# Patient Record
Sex: FEMALE | Race: BLACK OR AFRICAN AMERICAN | Employment: OTHER | URBAN - METROPOLITAN AREA
[De-identification: names, ages, dates, MRNs, and addresses within clinical notes are randomized per-mention and may not be internally consistent; named-entity substitution may affect disease eponyms.]

---

## 2019-04-30 ENCOUNTER — HOSPITAL ENCOUNTER (OUTPATIENT)
Dept: PHYSICAL THERAPY | Age: 69
Discharge: HOME | End: 2019-04-30
Payer: MEDICARE

## 2019-04-30 PROCEDURE — 97162 PT EVAL MOD COMPLEX 30 MIN: CPT

## 2019-04-30 PROCEDURE — 97110 THERAPEUTIC EXERCISES: CPT

## 2019-04-30 NOTE — PROGRESS NOTES
Joanna Huber Dell  : 1950  [place]  Secondary: Devon Melendez at [facility]  [address] [place] [address]  [phone]   [phone]    OUTPATIENT PHYSICAL THERAPY: Daily Treatment Note 2019  Pre-treatment Symptoms/Complaints:  Patient reports general lumbar and B buttock pain that exacerbates as the day progresses and is best in the morning. She denies any difficulty sleeping and reports pain as the day progresses is eased with taking Tramadol. She denies any LE paresthesia or pain, saddles anesthesia or any increase pain with cough or sneeze. Pain: Initial: Pain Intensity 1: 4  Pain Location 1: Spine, lumbar, Buttocks  Pain Orientation 1: Posterior  Pain Intervention(s) 1: Rest, Repositioned Post Session:  2/10   Medications Last Reviewed: 2019  Updated Objective Findings (Used abbreviations: VCT - Vertical Compression Test; LPM - Lumbar Protective Mechanism):  See evaluation. TREATMENT:   Manual Therapy (0 minutes): (Used abbreviations: SF - Superficial Fascia; BC - Bony contours; MP - muscle play; PNF - proprioceptive neuromuscular facilitation; IASTM - instrument-assisted soft tissue mobilization; MET - muscle energy technique; a/p - anterior to posterior; p/a - posterior to anterior) Manual treatment to improve soft tissue/joint mobility deficits, tissue integrity issues, trigger points and/or pain. Date:         Technique                                Neuromuscular Reeducation: (0 minutes):  Exercise/activities per grid below to improve movement, balance, coordination, kinesthetic sense, posture and/or proprioception. Required minimal verbal and manual cues to promote proper body alignment. Activity                                Therapeutic Exercise: (20 minutes):  Exercises per grid below to improve strength, endurance, range of motion, flexibility.  Required minimal verbal and manual cues to promote proper body alignment. Date:  04/30/19       Exercise        Walking/recumbent bike/UBE 15 min       Dynamic balance - tandem rhomberg  30 sec x 2 ea       Dynamic balance - single leg stance 15 sec x 3 ea       Home Exercise Program (HEP): as above; handouts given to patient for all exercises. [employer] [facility]  Treatment/Session Summary:    · Response to Treatment: Verbalized understanding and returned demo of home program.   · Compliance with Program/Exercises: Will assess as treatment progresses.   · Communication/Consultation:  None today  · Equipment provided today:  None today  · Recommendations/Intent for next treatment session: Progress mobility of hips, dymamic balance, and general LE strength and endurance  Treatment Plan of Care Effective Dates:  04/30/19 TO 07/23/19  Frequency of Treatment: 2 times a week for 6 weeks followed by 1 time a week for 6 weeks as progresses to more independence with gym program  Total Treatment Billable Duration:  20 minutes  PT Patient Time In/Time Out  Time In: 0900  Time Out: 1005  Delos [place], PT    Future Appointments   Date Time Provider Carmela Kim   5/6/2019  1:00 PM Hari Mangle, PT [facility]   5/8/2019 12:00 PM Hari Mangle, PT [facility]   5/13/2019  2:00 PM Hari Mangle, PT [facility]   5/16/2019  3:00 PM Hari Mangle, PT [facility]   5/20/2019  2:00 PM Hari Mangle, PT [facility]   5/22/2019  2:00 PM Hari Mangle, PT [facility]   5/28/2019  3:00 PM Varghese Curiel, PT [facility]

## 2019-04-30 NOTE — THERAPY EVALUATION
Joanna Linares Ou  : 1950  [place]  Secondary: Devon Melendez at [facility]  [address] [place] [address]  [phone]   [phone]       OUTPATIENT PHYSICAL THERAPY:Initial Assessment 2019   ICD-10: Treatment Diagnosis: Spinal stenosis, lumbar region (M48.06); Fusion of spine, lumbar region (M43.26); Difficulty walking (R26.2)  Precautions/Allergies: Codeine   MD Orders: Eval and treat MEDICAL/REFERRING DIAGNOSIS:  Back pain [M54.9]   DATE OF ONSET: 19  REFERRING PHYSICIAN: Jose D Molina MD  RETURN PHYSICIAN APPOINTMENT: 05/15/19   INITIAL ASSESSMENT:  Ms. Tadeo Fernandez presents with low back pain status post lumbar fusion due to worsening spinal stenosis with objective findings including loss of mobility through lumbopelvic region, thoracic/costal cage and B hip, generalized proximal hip and trunk weakness, dynamic balance deficits with limitations in functional mobility exhibiting medical necessity and need for skilled level of physical therapy. PROBLEM LIST (Impacting functional limitations):  1. Increased pain through low back limiting tolerance of ADLs and difficulty sleeping  2. Decreased activity tolerance for basic and instrumental ADLs  3. Decreased ADL/functional activities specificially with all functional mobility  4. Outcome measure score of 31/50 on Modified Oswestry with moderate effect of low back on patient's ability to manage every day life activities  5. Decreased strength through trunk and B LE limiting functional mobility  6. Decreased flexibility/mobility/ROM through thoracic, lumbar, pelvic and B hips limiting functional mobility  7. Decreased balance with decreased dynamic center of gravity control and decreased gaze stabilization with functional activities including gait, stairs, curbs, and mobility at home INTERVENTIONS PLANNED:  1.  Patient education including pathophysiology of post op plan and general guidelines  2. Back education & training (sleeping, sitting and standing positions, posture re-education and body mechanics)  3. ROM/mobility of spine, specifically thoracic and B hips  4. Manual therapy including soft tissue mobilizations, functional joint mobilizations and neuromuscular re-education to lumbopelvic region  5. Neuromuscular re-education with focus on initiation/strength and endurance of trunk and motor control with B LE  6. Therapeutic exercises including general endurance and strengthening of trunk and B LE  7. Gait training with focus on correcting deficits, sequencing and erendira  8. Balance exercise - focused on standing dynamic balance with greatest focus on dynamic center of gravity control  9. Instructions of home exercise program (HEP)   TREATMENT PLAN:  Treatment Plan of Care Effective Dates: 04/30/19 TO 07/23/19  Frequency of Treatment/Duration: 2 times a week for 6 weeks followed by 1 time a week for 6 weeks as progresses to more independence with gym program  GOALS: (Goals have been discussed and agreed upon with patient.)  Short-Term Functional Goals: Time Frame: 2 weeks  1. Patient will be independent with home program with cuing needed. 2. Patient will be able to attend gym for endurance exercises 3 days/week without difficulty or increased LBP  Discharge Goals: Time Frame: 12 weeks  1. Patient will be independent and able to tolerate all ADLs without increase of LBP  2. Patient will be independent with all functional mobility in community without deficits  3. Patient will score less than 20/50 on Modified Oswestry with minimum effect of low back on patient's ability to manage every day life activities  OUTCOME MEASURE:   Tool Used: Modified Oswestry Low Back Pain Questionnaire  Score:  Initial: 31/50 (04/30/19)  Most Recent: X/50 (Date: -- )   Interpretation of Score: Each section is scored on a 0-5 scale, 5 representing the greatest disability.   The scores of each section are added together for a total score of 50. MEDICAL NECESSITY:   · Patient is expected to demonstrate progress in strength, range of motion and balance to increase independence with functional mobility and improve safety during community activities. · Rehabilitation Potential For Stated Goals: Excellent  REASON FOR SERVICES/OTHER COMMENTS:  · Patient continues to require skilled intervention due to  loss of mobility through lumbopelvic region, thoracic/costal cage and B hip, generalized proximal hip and trunk weakness, dynamic balance deficits with limitations in functional mobility. Total Assessment Duration: 45 minutes  PT Patient Time In/Time Out  Time In: 0900  Time Out: 1005    Regarding Joanna Herrera's therapy, I certify that the treatment plan above will be carried out by a therapist or under their direction. Thank you for this referral,  Joao Aleman PT     Referring Physician Signature: Jigar Gupta MD _______________________________ Date _____________          The information in this section was collected on 4/30/2019 (except where otherwise noted). HISTORY:   History of Present Injury/Illness (Reason for Referral):  Patient reports gradual worsening of low back pain with neurogenic claudication that was affecting ability to perform daily activities and elected for L3-S1 B laminectomy, L3-S1 posterior fixation and posterior lateral fustion of L3/4 with posterior lateral and interbody fusion at L4/5 and L5/S1 on 03/27/19. Post-operatively, she had some complications from surgery that required her to be hospitalized x 10 days. She then was discharged from home and had home health physical therapy x 4 weeks before referral to outpatient physical therapy. Patient reports general lumbar and B buttock pain that exacerbates as the day progresses and is best in the morning.  She denies any difficulty sleeping and reports she is best in the am and pain worsens as the day progresses and is eased with taking Tramadol. She denies any LE paresthesia or pain, saddles anesthesia or any increase pain with cough or sneeze. Patient's goals for therapy are to resume all prior level of function with ADLs and independence with functional mobility in community including with gait and stairs. Past Medical History/Comorbidities:   Ms. Minerva Mendoza  has a past medical history of H/O bone density study (2013), H/O bone density study (2/15/2016), H/O colonoscopy (2012), HSV-2 (herpes simplex virus 2) infection, Hypercholesteremia, Hypertension, and Osteopenia (2013 [facility]). Ms. Minerva Mendoza  has a past surgical history that includes pr abdomen surgery proc unlisted; hx gyn; hx reynaldo and bso; hx gyn; hx other surgical; hx orthopaedic; hx orthopaedic; hx mohs procedure (Left, 05/20/2016); hx back surgery (2013); and hx back surgery (03/27/2019). Social History/Living Environment:  Patient lives alone. Patient denies and physical barriers at home. Prior Level of Function/Work/Activity:  Patient is retired. Patient reports a moderate physical activity level that includes exercising regularly at the [employer] 3-5 x/week. Ambulatory/Rehab Services H2 Model Falls Risk Assessment    Risk Factors:       No Risk Factors Identified Ability to Rise from Chair:       (0)  Ability to rise in a single movement    Falls Prevention Plan:       No modifications necessary   Total: (5 or greater = High Risk): 0    ©2010 [facility] [employer]. All Rights Reserved. [facility] Patent #[id number]. Federal Law prohibits the replication, distribution or use without written permission from [facility] Valentin Uzhun   Current Medications:  Tramadol 50 mg 1 pill 2x/day; Cyclobensaprine 10 mg 2 x/day     Current Outpatient Medications:     hydrocortisone (ANUSOL-HC) 2.5 % rectal cream, Insert  into rectum four (4) times daily. , Disp: 30 g, Rfl: 2    estradiol (ESTRACE) 0.5 mg tablet, Take 1 Tab by mouth daily. , Disp: 30 Tab, Rfl: 11   hydrochlorothiazide (HYDRODIURIL) 25 mg tablet, Take 25 mg by mouth daily. , Disp: , Rfl:     atorvastatin (LIPITOR) 20 mg tablet, Take  by mouth daily. , Disp: , Rfl:     Zolpidem 10 mg subl, by SubLINGual route., Disp: , Rfl:     Calcium-Cholecalciferol, D3, (CALCIUM 600 WITH VITAMIN D3) 600 mg(1,500mg) -400 unit cap, Take  by mouth., Disp: , Rfl:     Biotin 2,500 mcg cap, Take  by mouth., Disp: , Rfl:     olmesartan (BENICAR) 20 mg tablet, Take 40 mg by mouth daily. , Disp: , Rfl:     aspirin delayed-release (ASPIR-LOW) 81 mg tablet, Take 81 mg by mouth daily. , Disp: , Rfl:    Date Last Reviewed:  5/1/2019   Number of Personal Factors/Comorbidities that affect the Plan of Care: 1-2: MODERATE COMPLEXITY   EXAMINATION:   Observation/Orthostatic Postural Assessment: Patient exhibits a reduced lumbar lordosis and accentuated thoracic kyphosis with no type I curve. Costal cage is positioned posterior and rotated anterior over a posterior pelvic inclination. Lower extremity weight bearing is symmetrical. Shoulder symmetry exhibits R depressed and dominant. Observation of gait indicates decreased stance with short step length and very limited toe off B (patient is a leg walker versus an efficient trunk walker). Lower quadrant bony landmarks are symmetrical. Vertical compression test (VCT) for alignment is 1/5 with anterior pelvic shear. Soft tissue observation indicates mid-line well healing scar over lumbar region that shows constrictions over the upper 1/2; generalized atrophy through gluteals/hamstrings and paraspinals. Palpation: Myofascial assessment indicates significant scar restrictions through lumbar scar. Muscle length testing in modified Bobo position exhibits -30 from neutral B for iliopsoas. Hamstring length tested supine with straight leg raise (SLR) is 75 degrees B and WNL. Quadriceps flexibility tested prone with heel to buttock is WNL. Delman Ghent test is (-) B for rectus femoris tightness.  Gastrocnemius and soleus flexibility are WNL. ROM: Passive seated trunk rotation is 50% available to the R and 50% available to the L. Kinetic testing in standing including forward bend test is + B. Marcher's test is (-) B. Leg swing for innominate mobility indicates decreased extension B. Pelvic shear test is standing exhibits decreased excursion of B shear with a hard end feel end feel. Passive ROM through lumbopelvic flexion is hypomobile through lumbar, sacral, innominate and hip. Gross cervical rotation is 60% B. Shoulder abduction with palm down is 150 B. AROM (PROM) (* - denotes pain) Right Left   Hip flexion/Innominate flexion 80/90/100/100 80/90/100/100   Hip extension/Innominate extension -5/-5 -5/-5   Hip external rotation (ER) 35 40   Hip internal rotation (IR) 10 15   Hip abduction 20 20   Hip adduction 25 25   Strength: Lumbar protective mechanism (LPM) & Automatic Core Engagement (ACE):   Strength: */5; Engagement scored as present/absent/sluggish R diagonal L diagonal   LPM - Flexion absent absent   LPM - Extension absent absent     Manual Muscle Test (*/5) Right Left   Knee extension 5 5   Knee flexion 4+ 4+   Hip flexion 4+ 4+   Hip ER 4+ 4+   Hip IR 4 4   Hip extension 4+ 4+   Hip abduction 4+ 4+   Hip adduction 5 5   Ankle DF 5 5   Ankle PF 5 5   Special Tests:     Functional squat (LE clearance): limitation B hips  Janeal Downer test: (-) B  Mavis Rideau test is (-) B  Neurological Screen:  Myotomes: Key muscle strength testing through bilateral LE exhibits generalized weakness but no pattern. Dermatomes: Sensation testing through bilateral lower quadrants for light touch is intact. Reflexes: L3/4-Patellar; L5-Extensor digitorum brevis; S1-Achilles - 2+ and WNL.    Cord signs: Babinski/plantar response, clonus - (-)  Neural tension tests: Passive straight leg raise(SLR)/Lasègues test - (-); crossed SLR test - (-)  Functional Mobility:  Independent    Balance: Age appropriate for sitting  both statically and dynamically; age appropriate for standing statically and limited dynamically - single leg stance less than 5 second on R leg and less than 3 seconds on L; tandem rhomberg less than 15 sec B  Mental Status: Alert and oriented to person, time and place. Body Structures Involved:  3. Thoracic Cage  4. Joints  5. Muscles Body Functions Affected:  4. Sensory/Pain  5. Neuromusculoskeletal  6. Movement Related Activities and Participation Affected:  1. Mobility  2. Self Care  3.  Domestic Life   Number of elements (examined above) that affect the Plan of Care: 4+: HIGH COMPLEXITY   CLINICAL PRESENTATION:   Presentation: Evolving clinical presentation with changing clinical characteristics: MODERATE COMPLEXITY   CLINICAL DECISION MAKING:   Use of outcome tool(s) and clinical judgement create a POC that gives a: Questionable prediction of patient's progress: MODERATE COMPLEXITY        Kathrin Cruz PT    Future Appointments   Date Time Provider Carmela Kim   5/6/2019  1:00 PM [employer] Messenger, PT [facility]   5/8/2019 12:00 PM Xochilt Curiel, PT [facility]   5/13/2019  2:00 PM [place] Messenger, PT [facility]   5/16/2019  3:00 PM [place] Messenger, PT [facility]   5/20/2019  2:00 PM [place] Messenger, PT [facility]   5/22/2019  2:00 PM Avila Messenger, PT [facility]   5/28/2019  3:00 PM Xochilt Curiel, PT [facility]

## 2019-05-08 ENCOUNTER — HOSPITAL ENCOUNTER (OUTPATIENT)
Dept: PHYSICAL THERAPY | Age: 69
Discharge: HOME | End: 2019-05-08
Payer: MEDICARE

## 2019-05-08 PROCEDURE — 97140 MANUAL THERAPY 1/> REGIONS: CPT

## 2019-05-08 PROCEDURE — 97110 THERAPEUTIC EXERCISES: CPT

## 2019-05-08 NOTE — PROGRESS NOTES
Joanna Cline Nailer  : 1950  [place]  Secondary: Devon Melendez at [facility]  [address] [facility], [address] [place] [address]  [phone]   [phone]    OUTPATIENT PHYSICAL THERAPY: Daily Treatment Note 2019  Pre-treatment Symptoms/Complaints:  Patient reports general lumbar and B buttock pain that exacerbates as the day progresses and is best in the morning; only taking Tramadol at night now  Pain: Initial: Pain Intensity 1: 1  Pain Location 1: Buttocks  Pain Orientation 1: Posterior  Pain Intervention(s) 1: Rest, Repositioned Post Session:  2/10   Medications Last Reviewed: 2019  Updated Objective Findings (Used abbreviations: VCT - Vertical Compression Test; LPM - Lumbar Protective Mechanism: HISL - hip/innominate/sacral/lumbar mobility):   Date:  19 Date:   Date:     HISL 80/90/100/100 B     Hip/innominate extension -5/-5 B     LPM Absent all diagonals     VCT 1/5; anterior shear     Bobo -30 B     Passive seated trunk rotation 50% B     Single leg stance less than 5 second on R leg and less than 3 seconds on L     Tandem Rhomberg less than 15 sec B        TREATMENT:   Manual Therapy (20 minutes): (Used abbreviations: SF - Superficial Fascia; BC - Bony contours; MP - muscle play; PNF - proprioceptive neuromuscular facilitation; IASTM - instrument-assisted soft tissue mobilization; MET - muscle energy technique; a/p - anterior to posterior; p/a - posterior to anterior) Manual treatment to improve soft tissue/joint mobility deficits, tissue integrity issues, trigger points and/or pain.    Date:  19       Technique        Prone - SF/scar mobilty 5 min       Quadriceps stretching and B hip on axis 5 min       Hip distraction with flexion 5 min       Bobo 5 min       Neuromuscular Reeducation: (0 minutes):  Exercise/activities per grid below to improve movement, balance, coordination, kinesthetic sense, posture and/or proprioception. Required minimal verbal and manual cues to promote proper body alignment. Activity                                Therapeutic Exercise: (40 minutes):  Exercises per grid below to improve strength, endurance, range of motion, flexibility. Required minimal verbal and manual cues to promote proper body alignment. Date:  04/30/19 Date:  05/09/19      Exercise        Walking/recumbent bike/UBE 15 min 1/2 mile- 15 min; UBE 10 min      Active single knee to chest  5 min      B gluteal/hip rotator stretching in supine  5 min      Dynamic balance - tandem rhomberg  30 sec x 2 ea 30 sec x 2 ea      Dynamic balance - single leg stance 15 sec x 3 ea 15 sec x 3 ea      Home Exercise Program (HEP): as above; handouts given to patient for all exercises. [employer] [facility]  Treatment/Session Summary:    · Response to Treatment: Good endurance tolerance with 2 breaks;  Bobo improved to -10 B  · Compliance with Program/Exercises: Compliant  · Communication/Consultation:  None today  · Equipment provided today:  None today  · Recommendations/Intent for next treatment session: Progress mobility of hips, dymamic balance, and general LE strength and endurance  Treatment Plan of Care Effective Dates:  04/30/19 TO 07/23/19  Frequency of Treatment: 2 times a week for 5 weeks followed by 1 time a week for 6 weeks as progresses to more independence with gym program  Total Treatment Billable Duration: 60 minutes  PT Patient Time In/Time Out  Time In: 1200  Time Out: 1305  Kathrin Cruz PT    Future Appointments   Date Time Provider Carmela Kim   5/13/2019  2:00 PM Avila Collins, PT [facility]   5/16/2019  3:00 PM Avila Collins, PT [facility]   5/20/2019  2:00 PM Avila Collins, PT [facility]   5/22/2019  2:00 PM Avila Collins, PT [facility]   5/28/2019  3:00 PM Xochilt Curiel, PT [facility]

## 2019-05-13 ENCOUNTER — HOSPITAL ENCOUNTER (OUTPATIENT)
Dept: PHYSICAL THERAPY | Age: 69
Discharge: HOME | End: 2019-05-13
Payer: MEDICARE

## 2019-05-13 PROCEDURE — 97140 MANUAL THERAPY 1/> REGIONS: CPT

## 2019-05-13 PROCEDURE — 97110 THERAPEUTIC EXERCISES: CPT

## 2019-05-13 NOTE — PROGRESS NOTES
Joanna Herrera  : 1950  [place]  Secondary: Devon Melendez at [facility]  [address] [place] [address]  [phone]   [phone]    OUTPATIENT PHYSICAL THERAPY: Daily Treatment Note 2019  Pre-treatment Symptoms/Complaints:  Patient reports general lumbar and B buttock pain that exacerbates as the day progresses and is best in the morning; only taking Tramadol at night now  Pain: Initial: Pain Intensity 1: 1  Pain Location 1: Spine, lumbar, Buttocks  Pain Orientation 1: Posterior Post Session:  2/10   Medications Last Reviewed: 2019  Updated Objective Findings (Used abbreviations: VCT - Vertical Compression Test; LPM - Lumbar Protective Mechanism: HISL - hip/innominate/sacral/lumbar mobility):   Date:  19 Date:  19 Date:     HISL 80/90/100/100 B 85/95/105/105B    Hip/innominate extension -5/-5 B -5/-5 B    LPM Absent all diagonals Absent all diagonals    VCT 1/5; anterior shear 1/5; anterior shear    Bobo -30 B -20 B    Passive seated trunk rotation 50% B 50% B    Single leg stance less than 5 second on R leg and less than 3 seconds on L 5-10 sec B    Tandem Rhomberg less than 15 sec B Less 20 sec B       TREATMENT:   Manual Therapy (20 minutes): (Used abbreviations: SF - Superficial Fascia; BC - Bony contours; MP - muscle play; PNF - proprioceptive neuromuscular facilitation; IASTM - instrument-assisted soft tissue mobilization; MET - muscle energy technique; a/p - anterior to posterior; p/a - posterior to anterior) Manual treatment to improve soft tissue/joint mobility deficits, tissue integrity issues, trigger points and/or pain.    Date:  19 Date:  19      Technique        Prone - SF/scar mobilty 5 min 5 min      Quadriceps stretching and B hip on axis 5 min 5 min      Hip distraction with flexion 5 min 5 min      Bobo 5 min 5 min      Neuromuscular Reeducation: (0 minutes):  Exercise/activities per grid below to improve movement, balance, coordination, kinesthetic sense, posture and/or proprioception. Required minimal verbal and manual cues to promote proper body alignment. Activity                                Therapeutic Exercise: (40 minutes):  Exercises per grid below to improve strength, endurance, range of motion, flexibility. Required minimal verbal and manual cues to promote proper body alignment. Date:  04/30/19 Date:  05/09/19 Date:  05/13/19     Exercise        Walking/recumbent bike/UBE 15 min 1/2 mile- 15 min; UBE 10 min 1/2 mile; ergometer 10 min     Active single knee to chest  5 min See manual     B gluteal/hip rotator stretching in supine  5 min See manual     Dynamic balance - tandem rhomberg  30 sec x 2 ea 30 sec x 2 ea 10 min     Dynamic balance - single leg stance 15 sec x 3 ea 15 sec x 3 ea 10 min     Home Exercise Program (HEP): as above; handouts given to patient for all exercises. [employer] Portal  Treatment/Session Summary:    · Response to Treatment: Good endurance tolerance with no breaks today for walking or ergometer;  Bobo improved to -10 B and hip to 90  · Compliance with Program/Exercises: Compliant  · Communication/Consultation:  None today  · Equipment provided today:  None today  · Recommendations/Intent for next treatment session: Progress mobility of hips, dymamic balance, and general LE strength and endurance  Treatment Plan of Care Effective Dates:  04/30/19 TO 07/23/19  Frequency of Treatment: 2 times a week for 4 weeks followed by 1 time a week for 6 weeks as progresses to more independence with gym program  Total Treatment Billable Duration: 60 minutes  PT Patient Time In/Time Out  Time In: 1400  Time Out: 1500  Joao Aleman PT    Future Appointments   Date Time Provider Carmela Kim   5/16/2019  9:00 AM Hermilo Rader, PT [facility]   5/20/2019  2:00 PM Hermilo Rader, PT [facility]   5/22/2019 2:00 PM Jass Grant, PT [facility]   5/28/2019  3:00 PM Toribio Curiel, PT [facility]

## 2019-05-16 ENCOUNTER — HOSPITAL ENCOUNTER (OUTPATIENT)
Dept: PHYSICAL THERAPY | Age: 69
Discharge: HOME | End: 2019-05-16
Payer: MEDICARE

## 2019-05-16 PROCEDURE — 97110 THERAPEUTIC EXERCISES: CPT

## 2019-05-16 PROCEDURE — 97140 MANUAL THERAPY 1/> REGIONS: CPT

## 2019-05-16 NOTE — PROGRESS NOTES
Joanna Kingston  : 1950  [place]  Secondary: Devon Melendez at [facility]  [address] [place] [address]  [phone]   [phone]    OUTPATIENT PHYSICAL THERAPY: Daily Treatment Note 2019  Pre-treatment Symptoms/Complaints:  Patient reports returned to see Dr. Ilan Ricketts and released to return to therapeutic yoga and follow up with x-rays in 6 more weeks. Reports general lumbar and B buttock pain that exacerbates as the day progresses and is best in the morning; only taking Tramadol at night now. Pain: Initial: Pain Intensity 1: 1  Pain Location 1: Spine, lumbar, Buttocks  Pain Orientation 1: Posterior Post Session:  2/10   Medications Last Reviewed: 2019  Updated Objective Findings (Used abbreviations: VCT - Vertical Compression Test; LPM - Lumbar Protective Mechanism: HISL - hip/innominate/sacral/lumbar mobility):   Date:  19 Date:  19 Date:  19   HISL 80/90/100/100 B 85/95/105/105B 85/95/105/105B   Hip/innominate extension -5/-5 B -5/-5 B -5/-5 B   LPM Absent all diagonals Absent all diagonals Sluggish; 1/5 all diagonals   VCT 1/5; anterior shear 1/5; anterior shear 1/5; anterior shear   Bobo -30 B -20 B -20 B   Passive seated trunk rotation 50% B 50% B 50% B   Single leg stance less than 5 second on R leg and less than 3 seconds on L 5-10 sec B 5-10 sec B   Tandem Rhomberg less than 15 sec B Less 20 sec B Less 20 sec B      TREATMENT:   Manual Therapy (15 minutes): (Used abbreviations: SF - Superficial Fascia; BC - Bony contours; MP - muscle play; PNF - proprioceptive neuromuscular facilitation; IASTM - instrument-assisted soft tissue mobilization; MET - muscle energy technique; a/p - anterior to posterior; p/a - posterior to anterior) Manual treatment to improve soft tissue/joint mobility deficits, tissue integrity issues, trigger points and/or pain.    Date:  19 Date:  05/13/19 Date:  05/16/19     Technique        Prone - SF/scar mobilty 5 min 5 min -     Quadriceps stretching and B hip on axis 5 min 5 min 5 min     Hip distraction with flexion 5 min 5 min 5 min     Bobo 5 min 5 min 5 min     Neuromuscular Reeducation: (0 minutes):  Exercise/activities per grid below to improve movement, balance, coordination, kinesthetic sense, posture and/or proprioception. Required minimal verbal and manual cues to promote proper body alignment. Activity                                Therapeutic Exercise: (45 minutes):  Exercises per grid below to improve strength, endurance, range of motion, flexibility. Required minimal verbal and manual cues to promote proper body alignment. Date:  04/30/19 Date:  05/09/19 Date:  05/13/19 Date:  05/16/19    Exercise        Walking/recumbent bike/UBE 15 min 1/2 mile- 15 min; UBE 10 min 1/2 mile; ergometer 10 min 1/2 mile without rest    Active single knee to chest  5 min See manual 10 x 1    B gluteal/hip rotator stretching in supine  5 min See manual 30 sec x 1 ea    Dynamic balance - tandem rhomberg  30 sec x 2 ea 30 sec x 2 ea 10 min 10 min    Dynamic balance - single leg stance 15 sec x 3 ea 15 sec x 3 ea 10 min 10 min    Home Exercise Program (HEP): as above; handouts given to patient for all exercises.   [employer] Portal  Treatment/Session Summary:    · Response to Treatment: Lavinia Kehr improved to -10 B and hip to 90  · Compliance with Program/Exercises: Compliant  · Communication/Consultation:  None today  · Equipment provided today:  None today  · Recommendations/Intent for next treatment session: Progress mobility of hips, dymamic balance, and general LE strength and endurance  Treatment Plan of Care Effective Dates:  04/30/19 TO 07/23/19  Frequency of Treatment: 2 times a week for 4 weeks followed by 1 time a week for 6 weeks as progresses to more independence with gym program  Total Treatment Billable Duration: 55 minutes  PT Patient Time In/Time Out  Time In: 0910  Time Out: 1010  Darcy Gamez, PT    Future Appointments   Date Time Provider Carmela Judy   5/20/2019  2:00 PM Anthony Thakkar, PT [facility]   5/22/2019  2:00 PM Anthony Thakkar, PT [facility]   5/28/2019  3:00 PM Anthony Thakkar, PT [facility]   6/10/2019 10:00 AM Anthony Thakkar, PT [facility]   6/12/2019 10:00 AM Anthony Thakkar, PT [facility]   6/17/2019 10:00 AM Anthony Thakkar, PT [facility]   6/19/2019 10:00 AM Anthony Thakkar, PT [facility]   6/25/2019  3:00 PM Anthony Thakkar, PT [facility]   6/27/2019  9:00 AM Sherie Curiel, PT [facility]

## 2019-05-20 ENCOUNTER — HOSPITAL ENCOUNTER (OUTPATIENT)
Dept: PHYSICAL THERAPY | Age: 69
Discharge: HOME | End: 2019-05-20
Payer: MEDICARE

## 2019-05-20 PROCEDURE — 97140 MANUAL THERAPY 1/> REGIONS: CPT

## 2019-05-20 PROCEDURE — 97110 THERAPEUTIC EXERCISES: CPT

## 2019-05-20 NOTE — PROGRESS NOTES
Joanna Ferguson  : 1950  [place]  Secondary: Devon Melendez at [facility]  [address] [place] [address], [facility], [address] [place] [address]  [phone]   [phone]    OUTPATIENT PHYSICAL THERAPY: Daily Treatment Note 2019  Pre-treatment Symptoms/Complaints:  Patient reports general lumbar ache/pain that exacerbates as the day progresses and is best in the morning; only taking Tramadol at night now. Pain: Initial: Pain Intensity 1: 2  Pain Location 1: Spine, lumbar  Pain Orientation 1: Posterior, Mid Post Session:  0/10   Medications Last Reviewed: 2019  Updated Objective Findings (Used abbreviations: VCT - Vertical Compression Test; LPM - Lumbar Protective Mechanism: HISL - hip/innominate/sacral/lumbar mobility):   Date:  19 Date:  19 Date:  19 Date:  19   HISL 80/90/100/100 B 85/95/105/105B 85/95/105/105B 85/95/105/105B   Hip/innominate extension -5/-5 B -5/-5 B -5/-5 B -5/-5 B   LPM Absent all diagonals Absent all diagonals Sluggish; 1/5 all diagonals Sluggish; 1/5 all diagonals   VCT 1/5; anterior shear 1/5; anterior shear 1/5; anterior shear 1/5; anterior shear   Bobo -30 B -20 B -20 B -20 B   Passive seated trunk rotation 50% B 50% B 50% B 50% B   Single leg stance less than 5 second on R leg and less than 3 seconds on L 5-10 sec B 5-10 sec B 15 sec B   Tandem Rhomberg less than 15 sec B Less 20 sec B Less 20 sec B 30 sec B      TREATMENT:   Manual Therapy (20 minutes): (Used abbreviations: SF - Superficial Fascia; BC - Bony contours; MP - muscle play; PNF - proprioceptive neuromuscular facilitation; IASTM - instrument-assisted soft tissue mobilization; MET - muscle energy technique; a/p - anterior to posterior; p/a - posterior to anterior) Manual treatment to improve soft tissue/joint mobility deficits, tissue integrity issues, trigger points and/or pain.    Date:  19 Date:  19 Date:  05/16/19 Date:  05/20/19    Technique        Prone - SF/scar mobilty 5 min 5 min - 5 min    Quadriceps stretching and B hip on axis 5 min 5 min 5 min 5 min    Hip distraction with flexion 5 min 5 min 5 min 5 min    Bobo/1/2 prone rectus femoris 5 min 5 min 5 min 5 min    Neuromuscular Reeducation: (0 minutes):  Exercise/activities per grid below to improve movement, balance, coordination, kinesthetic sense, posture and/or proprioception. Required minimal verbal and manual cues to promote proper body alignment. Activity                                Therapeutic Exercise: (40 minutes):  Exercises per grid below to improve strength, endurance, range of motion, flexibility. Required minimal verbal and manual cues to promote proper body alignment. Date:  04/30/19 Date:  05/09/19 Date:  05/13/19 Date:  05/16/19 Date:  05/20/19   Exercise        Walking/recumbent bike/UBE 15 min 1/2 mile- 15 min; UBE 10 min 1/2 mile; ergometer 10 min 1/2 mile without rest 3/4 mile without rest   Cat/Cow for spinal mobility     5 x 1   Active single knee to chest  5 min See manual 10 x 1 HEP   B gluteal/hip rotator stretching in supine  5 min See manual 30 sec x 1 ea HEP   Dynamic balance - tandem rhomberg  30 sec x 2 ea 30 sec x 2 ea 10 min 10 min 10 min   Dynamic balance - single leg stance 15 sec x 3 ea 15 sec x 3 ea 10 min 10 min 10 min   Home Exercise Program (HEP): as above; handouts given to patient for all exercises.   [facility] Portal  Treatment/Session Summary:    · Response to Treatment: Kellie Huynh improved to -10 B and hip to 90  · Compliance with Program/Exercises: Compliant  · Communication/Consultation:  None today  · Equipment provided today:  None today  · Recommendations/Intent for next treatment session: Progress mobility of hips, dymamic balance, and general LE strength and endurance  Treatment Plan of Care Effective Dates:  04/30/19 TO 07/23/19  Frequency of Treatment: 2 times a week for 3 weeks followed by 1 time a week for 6 weeks as progresses to more independence with gym program  Total Treatment Billable Duration: 60 minutes  PT Patient Time In/Time Out  Time In: 1400  Time Out: 1500  Darcy Gamez, PT    Future Appointments   Date Time Provider Carmela Kim   5/22/2019  2:00 PM Anthony Thakkar, PT [facility]   5/28/2019  3:00 PM Anthony Thakkar, PT [facility]   6/10/2019 10:00 AM Anthony Thakkar, PT [facility]   6/12/2019 10:00 AM Anthony Thakkar, PT [facility]   6/17/2019 10:00 AM Anthony Thakkar, PT [facility]   6/19/2019 10:00 AM Anthony Thakkar, PT [facility]   6/25/2019  3:00 PM Anthony Thakkar, PT [facility]   6/27/2019  9:00 AM Sherie Curiel, PT [facility]

## 2019-05-22 ENCOUNTER — HOSPITAL ENCOUNTER (OUTPATIENT)
Dept: PHYSICAL THERAPY | Age: 69
Discharge: HOME | End: 2019-05-22
Payer: MEDICARE

## 2019-05-22 PROCEDURE — 97140 MANUAL THERAPY 1/> REGIONS: CPT

## 2019-05-22 PROCEDURE — 97110 THERAPEUTIC EXERCISES: CPT

## 2019-05-22 NOTE — PROGRESS NOTES
Dianthius Scherry Oppenheim  : 1950  [place]  Secondary: Devon Melendez at [facility]  [address] [place] [address]  [phone]   [phone]    OUTPATIENT PHYSICAL THERAPY: Daily Treatment Note 2019  Pre-treatment Symptoms/Complaints:  Patient reports general lumbar ache/pain that exacerbates as the day progresses and is best in the morning; only taking Tramadol at night now.   Pain: Initial: Pain Intensity 1: 1  Pain Location 1: Spine, lumbar  Pain Orientation 1: Posterior, Mid Post Session:  0/10   Medications Last Reviewed: 2019  Updated Objective Findings (Used abbreviations: VCT - Vertical Compression Test; LPM - Lumbar Protective Mechanism: HISL - hip/innominate/sacral/lumbar mobility):   Date:  19 Date:  19 Date:  19 Date:  19 Date:  19   HISL 80/90/100/100 B 85/95/105/105B 85/95/105/105B 85/95/105/105B 85/95/105/105B   Hip/innominate extension -5/-5 B -5/-5 B -5/-5 B -5/-5 B -5/-5 B   LPM Absent all diagonals Absent all diagonals Sluggish; 1/5 all diagonals Sluggish; 1/5 all diagonals Sluggish; 1/5 all diagonals   VCT 1/5; anterior shear 1/5; anterior shear 1/5; anterior shear 1/5; anterior shear 1/5; anterior shear   Bobo -30 B -20 B -20 B -20 B -20 B   Passive seated trunk rotation 50% B 50% B 50% B 50% B 50% B   Single leg stance less than 5 second on R leg and less than 3 seconds on L 5-10 sec B 5-10 sec B 15 sec B 15 sec B   Tandem Rhomberg less than 15 sec B Less 20 sec B Less 20 sec B 30 sec B 30 sec B      TREATMENT:   Manual Therapy (20 minutes): (Used abbreviations: SF - Superficial Fascia; BC - Bony contours; MP - muscle play; PNF - proprioceptive neuromuscular facilitation; IASTM - instrument-assisted soft tissue mobilization; MET - muscle energy technique; a/p - anterior to posterior; p/a - posterior to anterior) Manual treatment to improve soft tissue/joint mobility deficits, tissue integrity issues, trigger points and/or pain. Date:  05/09/19 Date:  05/13/19 Date:  05/16/19 Date:  05/20/19 Date:  05/22/19   Technique        Prone - SF/scar mobilty 5 min 5 min - 5 min 5 min   Quadriceps stretching and B hip on axis 5 min 5 min 5 min 5 min 5 min   Hip distraction with flexion 5 min 5 min 5 min 5 min 5 min   Bobo/1/2 prone rectus femoris 5 min 5 min 5 min 5 min 5 min   Neuromuscular Reeducation: (0 minutes):  Exercise/activities per grid below to improve movement, balance, coordination, kinesthetic sense, posture and/or proprioception. Required minimal verbal and manual cues to promote proper body alignment. Activity                                Therapeutic Exercise: (40 minutes):  Exercises per grid below to improve strength, endurance, range of motion, flexibility. Required minimal verbal and manual cues to promote proper body alignment. Date:  04/30/19 Date:  05/09/19 Date:  05/13/19 Date:  05/16/19 Date:  05/20/19 Date:  05/22/19   Exercise         Walking/recumbent bike/UBE 15 min 1/2 mile- 15 min; UBE 10 min 1/2 mile; ergometer 10 min 1/2 mile without rest 3/4 mile without rest 3/4 mile without rest   Cat/Cow for spinal mobility     5 x 1 5 x 1   Active single knee to chest  5 min See manual 10 x 1 HEP HEP   B gluteal/hip rotator stretching in supine  5 min See manual 30 sec x 1 ea HEP HEP   Dynamic balance - tandem rhomberg  30 sec x 2 ea 30 sec x 2 ea 10 min 10 min 10 min 10 min   Dynamic balance - single leg stance 15 sec x 3 ea 15 sec x 3 ea 10 min 10 min 10 min 10 min   Home Exercise Program (HEP): as above; handouts given to patient for all exercises.   [employer] Portal  Treatment/Session Summary:    · Response to Treatment: [place] improved to -10 B and hip to 90  · Compliance with Program/Exercises: Compliant  · Communication/Consultation:  None today  · Equipment provided today:  None today  · Recommendations/Intent for next treatment session: Progress mobility of hips, dymamic balance, and general LE strength and endurance  Treatment Plan of Care Effective Dates:  04/30/19 TO 07/23/19  Frequency of Treatment: 2 times a week for 3 weeks followed by 1 time a week for 6 weeks as progresses to more independence with gym program  Total Treatment Billable Duration: 60 minutes  PT Patient Time In/Time Out  Time In: 1405  Time Out: 23 Yadira Beltran, PT    Future Appointments   Date Time Provider Carmela Kim   5/28/2019  3:00 PM July Floyd, PT [facility]   6/10/2019 10:00 AM Harlem Heightshaily Floyd, PT [facility]   6/12/2019 10:00 AM July Floyd, PT [facility]   6/17/2019 10:00 AM July Block, PT [facility]   6/19/2019 10:00 AM July Floyd, PT [facility]   6/25/2019  3:00 PM July Floyd, PT [facility]   6/27/2019  9:00 AM Elio Curiel, PT [facility]

## 2019-05-28 ENCOUNTER — HOSPITAL ENCOUNTER (OUTPATIENT)
Dept: PHYSICAL THERAPY | Age: 69
Discharge: HOME | End: 2019-05-28
Payer: MEDICARE

## 2019-05-28 PROCEDURE — 97110 THERAPEUTIC EXERCISES: CPT

## 2019-05-28 PROCEDURE — 97140 MANUAL THERAPY 1/> REGIONS: CPT

## 2019-05-28 NOTE — PROGRESS NOTES
Joanna Zuniga  : 1950  [place]  Secondary: Devon Melendez at [facility]  [address] [place] [address]  [phone]   [phone]    OUTPATIENT PHYSICAL THERAPY: Daily Treatment Note 2019  Pre-treatment Symptoms/Complaints:  Patient reports general lumbar ache/pain that exacerbates as the day progresses and is best in the morning; only taking Tramadol at night now.   Pain: Initial: Pain Intensity 1: 1  Pain Location 1: Spine, lumbar  Pain Orientation 1: Posterior, Mid Post Session:  0/10   Medications Last Reviewed: 2019  Updated Objective Findings (Used abbreviations: VCT - Vertical Compression Test; LPM - Lumbar Protective Mechanism: HISL - hip/innominate/sacral/lumbar mobility):   Date:  19 Date:  19 Date:  19 Date:  19 Date:  19 Date:  19   HISL 80/90/100/100 B 85/95/105/105B 85/95/105/105B 85/95/105/105B 85/95/105/105B 85/95/105/105B   Hip/innominate extension -5/-5 B -5/-5 B -5/-5 B -5/-5 B -5/-5 B -5/-5 B   LPM Absent all diagonals Absent all diagonals Sluggish; 1/5 all diagonals Sluggish; 1/5 all diagonals Sluggish; 1/5 all diagonals Sluggish; 1/5 all diagonals   VCT 1/5; anterior shear 1/5; anterior shear 1/5; anterior shear 1/5; anterior shear 1/5; anterior shear 2/5; anterior shear   Bobo -30 B -20 B -20 B -20 B -20 B -10 B   Passive seated trunk rotation 50% B 50% B 50% B 50% B 50% B 50% B   Single leg stance less than 5 second on R leg and less than 3 seconds on L 5-10 sec B 5-10 sec B 15 sec B 15 sec B 30 sec B   Tandem Rhomberg less than 15 sec B Less 20 sec B Less 20 sec B 30 sec B 30 sec B 30 sec B      TREATMENT:   Manual Therapy (20 minutes): (Used abbreviations: SF - Superficial Fascia; BC - Bony contours; MP - muscle play; PNF - proprioceptive neuromuscular facilitation; IASTM - instrument-assisted soft tissue mobilization; MET - muscle energy technique; a/p - anterior to posterior; p/a - posterior to anterior) Manual treatment to improve soft tissue/joint mobility deficits, tissue integrity issues, trigger points and/or pain. Date:  05/09/19 Date:  05/13/19 Date:  05/16/19 Date:  05/20/19 Date:  05/22/19 Date:  05/28/19   Technique         Prone - SF/scar mobilty 5 min 5 min - 5 min 5 min 10 min   Quadriceps stretching and B hip on axis 5 min 5 min 5 min 5 min 5 min 5 min   Hip distraction with flexion 5 min 5 min 5 min 5 min 5 min 5 min   Bobo/1/2 prone rectus femoris 5 min 5 min 5 min 5 min 5 min -   Neuromuscular Reeducation: (0 minutes):  Exercise/activities per grid below to improve movement, balance, coordination, kinesthetic sense, posture and/or proprioception. Required minimal verbal and manual cues to promote proper body alignment. Activity                                Therapeutic Exercise: (40 minutes):  Exercises per grid below to improve strength, endurance, range of motion, flexibility. Required minimal verbal and manual cues to promote proper body alignment. Date:  04/30/19 Date:  05/09/19 Date:  05/13/19 Date:  05/16/19 Date:  05/20/19 Date:  05/22/19 Date:  05/28/19   Exercise          Walking/recumbent bike/UBE 15 min 1/2 mile- 15 min; UBE 10 min 1/2 mile; ergometer 10 min 1/2 mile without rest 3/4 mile without rest 3/4 mile without rest 1 mile without rest   Cat/Cow for spinal mobility     5 x 1 5 x 1 5 x 1   Active single knee to chest  5 min See manual 10 x 1 HEP HEP HEP   B gluteal/hip rotator stretching in supine  5 min See manual 30 sec x 1 ea HEP HEP HEP   Dynamic balance - tandem rhomberg  30 sec x 2 ea 30 sec x 2 ea 10 min 10 min 10 min 10 min 10 min   Dynamic balance - single leg stance 15 sec x 3 ea 15 sec x 3 ea 10 min 10 min 10 min 10 min 10 min   Home Exercise Program (HEP): as above; handouts given to patient for all exercises.   [employer] Portal  Treatment/Session Summary:    · Response to Treatment: Anra Fultonham improved to -5 B and hip to 90  · Compliance with Program/Exercises: Compliant  · Communication/Consultation:  None today  · Equipment provided today:  None today  · Recommendations/Intent for next treatment session: Progress mobility of hips, dymamic balance, and general LE strength and endurance  Treatment Plan of Care Effective Dates:  04/30/19 TO 07/23/19  Frequency of Treatment: 2 times a week for 2 weeks followed by 1 time a week for 6 weeks as progresses to more independence with gym program  Total Treatment Billable Duration: 65 minutes  PT Patient Time In/Time Out  Time In: 1255  Time Out: 1600  Mitzy Smith PT    Future Appointments   Date Time Provider Carmela Kim   6/10/2019 10:00 AM Apryl Yepez PT [facility]   6/12/2019 10:00 AM Apryl Yepez PT [facility]   6/17/2019 10:00 AM Apryl Yepez PT [facility]   6/19/2019 10:00 AM Apryl Yepez PT [facility]   6/25/2019  3:00 PM Apryl Yepez PT [facility]   6/27/2019  9:00 AM Katja Curiel, PT [facility]

## 2019-06-10 ENCOUNTER — HOSPITAL ENCOUNTER (OUTPATIENT)
Dept: PHYSICAL THERAPY | Age: 69
Discharge: HOME | End: 2019-06-10
Payer: MEDICARE

## 2019-06-10 PROCEDURE — 97110 THERAPEUTIC EXERCISES: CPT

## 2019-06-10 PROCEDURE — 97140 MANUAL THERAPY 1/> REGIONS: CPT

## 2019-06-10 NOTE — PROGRESS NOTES
Joanna Solis  : 1950  [place]  Secondary: Devon Melendez at [facility]  [address] [place] [address]  [phone]   [phone]    OUTPATIENT PHYSICAL THERAPY: Daily Treatment Note 6/10/2019  Pre-treatment Symptoms/Complaints:  Patient reports general lumbar ache/pain that exacerbates as the day progresses and is best in the morning; stopped taking Tramadol at night.   Pain: Initial: Pain Intensity 1: 2  Pain Location 1: Spine, lumbar  Pain Orientation 1: Posterior Post Session:  0/10   Medications Last Reviewed: 6/10/2019  Updated Objective Findings (Used abbreviations: VCT - Vertical Compression Test; LPM - Lumbar Protective Mechanism: HISL - hip/innominate/sacral/lumbar mobility):   Date:  19 Date:  19 Date:  19 Date:  19 Date:  19 Date:  19 Date:  06/10/19   HISL 80/90/100/100 B 85/95/105/105B 85/95/105/105B 85/95/105/105B 85/95/105/105B 85/95/105/105B 89/100/110/110L; 85/95/105/105R   Hip/innominate extension -5/-5 B -5/-5 B -5/-5 B -5/-5 B -5/-5 B -5/-5 B -5/-5 B   LPM Absent all diagonals Absent all diagonals Sluggish; 1/5 all diagonals Sluggish; 1/5 all diagonals Sluggish; 1/5 all diagonals Sluggish; 1/5 all diagonals Sluggish; 1/5 all diagonals   VCT 1/5; anterior shear 1/5; anterior shear 1/5; anterior shear 1/5; anterior shear 1/5; anterior shear 2/5; anterior shear 2/5; anterior shear   Bobo -30 B -20 B -20 B -20 B -20 B -10 B -10 B   Passive seated trunk rotation 50% B 50% B 50% B 50% B 50% B 50% B 50% B   Single leg stance less than 5 second on R leg and less than 3 seconds on L 5-10 sec B 5-10 sec B 15 sec B 15 sec B 30 sec B 30 sec B   Tandem Rhomberg less than 15 sec B Less 20 sec B Less 20 sec B 30 sec B 30 sec B 30 sec B 30 sec B      TREATMENT:   Manual Therapy (30 minutes): (Used abbreviations: SF - Superficial Fascia; BC - Bony contours; MP - muscle play; PNF - proprioceptive neuromuscular facilitation; IASTM - instrument-assisted soft tissue mobilization; MET - muscle energy technique; a/p - anterior to posterior; p/a - posterior to anterior) Manual treatment to improve soft tissue/joint mobility deficits, tissue integrity issues, trigger points and/or pain. Date:  05/09/19 Date:  05/13/19 Date:  05/16/19 Date:  05/20/19 Date:  05/22/19 Date:  05/28/19 Date:  06/10/19   Technique          Prone - SF/scar mobilty 5 min 5 min - 5 min 5 min 10 min 10 min   Quadriceps stretching and B hip on axis 5 min 5 min 5 min 5 min 5 min 5 min 5 min   Hip distraction with flexion 5 min 5 min 5 min 5 min 5 min 5 min 10 min   Bobo/1/2 prone rectus femoris 5 min 5 min 5 min 5 min 5 min - 5 min   Neuromuscular Reeducation: (0 minutes):  Exercise/activities per grid below to improve movement, balance, coordination, kinesthetic sense, posture and/or proprioception. Required minimal verbal and manual cues to promote proper body alignment. Activity                                Therapeutic Exercise: (30 minutes):  Exercises per grid below to improve strength, endurance, range of motion, flexibility. Required minimal verbal and manual cues to promote proper body alignment.     Date:  04/30/19 Date:  05/09/19 Date:  05/13/19 Date:  05/16/19 Date:  05/20/19 Date:  05/22/19 Date:  05/28/19 Date:  06/10/19   Exercise           Walking/recumbent bike/UBE 15 min 1/2 mile- 15 min; UBE 10 min 1/2 mile; ergometer 10 min 1/2 mile without rest 3/4 mile without rest 3/4 mile without rest 1 mile without rest Ergometer x 12 min   Cat/Cow for spinal mobility     5 x 1 5 x 1 5 x 1 5 x 1   Active single knee to chest  5 min See manual 10 x 1 HEP HEP HEP 5 x 1   B gluteal/hip rotator stretching in supine  5 min See manual 30 sec x 1 ea HEP HEP HEP 5 x 1   Dynamic balance - tandem rhomberg  30 sec x 2 ea 30 sec x 2 ea 10 min 10 min 10 min 10 min 10 min 10 min   Dynamic balance - single leg stance 15 sec x 3 ea 15 sec x 3 ea 10 min 10 min 10 min 10 min 10 min 10 min   Home Exercise Program (HEP): as above; handouts given to patient for all exercises.   [employer] Portal  Treatment/Session Summary:    · Response to Treatment: Bobby Gain improved to -5 B and hip to 90 B  · Compliance with Program/Exercises: Compliant  · Communication/Consultation:  None today  · Equipment provided today:  None today  · Recommendations/Intent for next treatment session: Progress mobility of hips, dymamic balance, and general LE strength and endurance  Treatment Plan of Care Effective Dates:  04/30/19 TO 07/23/19  Frequency of Treatment: 2 times a week for 1 weeks followed by 1 time a week for 6 weeks as progresses to more independence with gym program  Total Treatment Billable Duration: 60 minutes  PT Patient Time In/Time Out  Time In: 1000  Time Out: 1401 Jonelle Vera, PT    Future Appointments   Date Time Provider Carmela Kim   6/12/2019 10:00 AM Brayan Luis, PT [facility]   6/17/2019 10:00 AM Brayan Luis, PT [facility]   6/19/2019 10:00 AM Brayan Luis, PT [facility]   6/25/2019  3:00 PM Brayan Smith, PT [facility]   6/27/2019  9:00 AM Jaki Curiel, PT [facility]

## 2019-06-12 ENCOUNTER — HOSPITAL ENCOUNTER (OUTPATIENT)
Dept: PHYSICAL THERAPY | Age: 69
Discharge: HOME | End: 2019-06-12
Payer: MEDICARE

## 2019-06-12 PROCEDURE — 97110 THERAPEUTIC EXERCISES: CPT

## 2019-06-12 PROCEDURE — 97140 MANUAL THERAPY 1/> REGIONS: CPT

## 2019-06-12 NOTE — PROGRESS NOTES
Joanna Kingston  : 1950  [place]  Secondary: Devon Melendez at [facility]  [address] [place] [address]  [phone]   [phone]    OUTPATIENT PHYSICAL THERAPY: Daily Treatment Note 2019  Pre-treatment Symptoms/Complaints:  Patient reports general lumbar ache/pain that exacerbates as the day progresses and is best in the morning.  She reports she was able to do line-dancing last night but had to take a Tramadol this am.  Pain: Initial: Pain Intensity 1: 2  Pain Location 1: Spine, lumbar  Pain Orientation 1: Posterior Post Session:  0/10   Medications Last Reviewed: 2019  Updated Objective Findings (Used abbreviations: VCT - Vertical Compression Test; LPM - Lumbar Protective Mechanism: HISL - hip/innominate/sacral/lumbar mobility):   Date:  19 Date:  19 Date:  19 Date:  19 Date:  19 Date:  19 Date:  06/10/19   HISL 80/90/100/100 B 85/95/105/105B 85/95/105/105B 85/95/105/105B 85/95/105/105B 85/95/105/105B 89/100/110/110L; 85/95/105/105R   Hip/innominate extension -5/-5 B -5/-5 B -5/-5 B -5/-5 B -5/-5 B -5/-5 B -5/-5 B   LPM Absent all diagonals Absent all diagonals Sluggish; 1/5 all diagonals Sluggish; 1/5 all diagonals Sluggish; 1/5 all diagonals Sluggish; 1/5 all diagonals Sluggish; 1/5 all diagonals   VCT 1/5; anterior shear 1/5; anterior shear 1/5; anterior shear 1/5; anterior shear 1/5; anterior shear 2/5; anterior shear 2/5; anterior shear   Bobo -30 B -20 B -20 B -20 B -20 B -10 B -10 B   Passive seated trunk rotation 50% B 50% B 50% B 50% B 50% B 50% B 50% B   Single leg stance less than 5 second on R leg and less than 3 seconds on L 5-10 sec B 5-10 sec B 15 sec B 15 sec B 30 sec B 30 sec B   Tandem Rhomberg less than 15 sec B Less 20 sec B Less 20 sec B 30 sec B 30 sec B 30 sec B 30 sec B      TREATMENT:   Manual Therapy (30 minutes): (Used abbreviations: SF - Superficial Fascia; BC - Bony contours; MP - muscle play; PNF - proprioceptive neuromuscular facilitation; IASTM - instrument-assisted soft tissue mobilization; MET - muscle energy technique; a/p - anterior to posterior; p/a - posterior to anterior) Manual treatment to improve soft tissue/joint mobility deficits, tissue integrity issues, trigger points and/or pain. Date:  05/09/19 Date:  05/13/19 Date:  05/16/19 Date:  05/20/19 Date:  05/22/19 Date:  05/28/19 Date:  06/10/19 Date:  06/12/19   Technique           Prone - SF/scar mobilty 5 min 5 min - 5 min 5 min 10 min 10 min 10min   Quadriceps stretching and B hip on axis 5 min 5 min 5 min 5 min 5 min 5 min 5 min 5 min   Hip distraction with flexion 5 min 5 min 5 min 5 min 5 min 5 min 10 min 10 min   Thomas/1/2 prone rectus femoris 5 min 5 min 5 min 5 min 5 min - 5 min 5 min   Neuromuscular Reeducation: (0 minutes):  Exercise/activities per grid below to improve movement, balance, coordination, kinesthetic sense, posture and/or proprioception. Required minimal verbal and manual cues to promote proper body alignment. Activity                                Therapeutic Exercise: (35 minutes):  Exercises per grid below to improve strength, endurance, range of motion, flexibility. Required minimal verbal and manual cues to promote proper body alignment.     Date:  04/30/19 Date:  05/09/19 Date:  05/13/19 Date:  05/16/19 Date:  05/20/19 Date:  05/22/19 Date:  05/28/19 Date:  06/10/19 Date:  06/12/19   Exercise            Walking/recumbent bike/UBE 15 min 1/2 mile- 15 min; UBE 10 min 1/2 mile; ergometer 10 min 1/2 mile without rest 3/4 mile without rest 3/4 mile without rest 1 mile without rest Ergometer x 12 min Ergometer x 12 min   Cat/Cow for spinal mobility     5 x 1 5 x 1 5 x 1 5 x 1 5 x 1   Active single knee to chest  5 min See manual 10 x 1 HEP HEP HEP 5 x 1 5 x 1   B gluteal/hip rotator stretching in supine  5 min See manual 30 sec x 1 ea HEP HEP HEP 5 x 1 5 x 1   Dynamic balance - tandem rhomberg  30 sec x 2 ea 30 sec x 2 ea 10 min 10 min 10 min 10 min 10 min 10 min 15 min   Dynamic balance - single leg stance 15 sec x 3 ea 15 sec x 3 ea 10 min 10 min 10 min 10 min 10 min 10 min 10 min   Home Exercise Program (HEP): as above; handouts given to patient for all exercises.   [employer] Portal  Treatment/Session Summary:    · Response to Treatment: Bobby Gain improved to -5 B and hip to 90 B  · Compliance with Program/Exercises: Compliant  · Communication/Consultation:  None today  · Equipment provided today:  None today  · Recommendations/Intent for next treatment session: Progress mobility of hips, dymamic balance, and general LE strength and endurance  Treatment Plan of Care Effective Dates:  04/30/19 TO 07/23/19  Frequency of Treatment: 2 times a week for 1 weeks followed by 1 time a week for 6 weeks as progresses to more independence with gym program  Total Treatment Billable Duration: 65 minutes  PT Patient Time In/Time Out  Time In: 1000  Time Out: 1 Shruti Major PT    Future Appointments   Date Time Provider Carmela Kim   6/17/2019 10:00 AM Brayan Smith PT [facility]   6/19/2019 10:00 AM Brayan Smith PT [facility]   6/25/2019  3:00 PM Brayan Smith PT [facility]   6/27/2019  9:00 AM Jaki Curiel, BRUNO [facility]

## 2019-06-17 ENCOUNTER — HOSPITAL ENCOUNTER (OUTPATIENT)
Dept: PHYSICAL THERAPY | Age: 69
Discharge: HOME | End: 2019-06-17
Payer: MEDICARE

## 2019-06-17 PROCEDURE — 97110 THERAPEUTIC EXERCISES: CPT

## 2019-06-17 PROCEDURE — 97140 MANUAL THERAPY 1/> REGIONS: CPT

## 2019-06-17 NOTE — PROGRESS NOTES
Joanna Ring  : 1950  [place]  Secondary: Devon Melendez at [facility]  [address] [place] [address]  [phone]   [phone]    OUTPATIENT PHYSICAL THERAPY: Daily Treatment Note 2019  Pre-treatment Symptoms/Complaints:  Patient reports decreased general lumbar ache/pain that exacerbates as the day progresses and is best in the morning. She has decreased Tramadol to as needed and not every night.   Pain: Initial: Pain Intensity 1: 1  Pain Location 1: Spine, lumbar  Pain Orientation 1: Posterior Post Session:  0/10   Medications Last Reviewed: 2019  Updated Objective Findings (Used abbreviations: VCT - Vertical Compression Test; LPM - Lumbar Protective Mechanism: HISL - hip/innominate/sacral/lumbar mobility):   Date:  19 Date:  19 Date:  19 Date:  19 Date:  19 Date:  19 Date:  06/10/19 Date:  19   HISL 80/90/100/100 B 85/95/105/105B 85/95/105/105B 85/95/105/105B 85/95/105/105B 85/95/105/105B 89/100/110/110L; 85/95/105/105R 89/100/110/110L; 85/95/105/105R   Hip/innominate extension -5/-5 B -5/-5 B -5/-5 B -5/-5 B -5/-5 B -5/-5 B -5/-5 B -5/-5 B   LPM Absent all diagonals Absent all diagonals Sluggish; 1/5 all diagonals Sluggish; 1/5 all diagonals Sluggish; 1/5 all diagonals Sluggish; 1/5 all diagonals Sluggish; 1/5 all diagonals Sluggish; 1/5 all diagonals   VCT 1/5; anterior shear 1/5; anterior shear 1/5; anterior shear 1/5; anterior shear 1/5; anterior shear 2/5; anterior shear 2/5; anterior shear 2/5; anterior shear   Bobo -30 B -20 B -20 B -20 B -20 B -10 B -10 B -10 B   Passive seated trunk rotation 50% B 50% B 50% B 50% B 50% B 50% B 50% B 50% B   Single leg stance less than 5 second on R leg and less than 3 seconds on L 5-10 sec B 5-10 sec B 15 sec B 15 sec B 30 sec B 30 sec B 30 sec B   Tandem Rhomberg less than 15 sec B Less 20 sec B Less 20 sec B 30 sec B 30 sec B 30 sec B 30 sec B 30 sec B      TREATMENT:   Manual Therapy (25 minutes): (Used abbreviations: SF - Superficial Fascia; BC - Bony contours; MP - muscle play; PNF - proprioceptive neuromuscular facilitation; IASTM - instrument-assisted soft tissue mobilization; MET - muscle energy technique; a/p - anterior to posterior; p/a - posterior to anterior) Manual treatment to improve soft tissue/joint mobility deficits, tissue integrity issues, trigger points and/or pain. Date:  05/09/19 Date:  05/13/19 Date:  05/16/19 Date:  05/20/19 Date:  05/22/19 Date:  05/28/19 Date:  06/10/19 Date:  06/12/19 Date:  06/17/19   Technique            Prone - SF/scar mobilty 5 min 5 min - 5 min 5 min 10 min 10 min 10min 10 min   Quadriceps stretching and B hip on axis 5 min 5 min 5 min 5 min 5 min 5 min 5 min 5 min 5 min   Hip distraction with flexion 5 min 5 min 5 min 5 min 5 min 5 min 10 min 10 min 5 min   Bobo/1/2 prone rectus femoris 5 min 5 min 5 min 5 min 5 min - 5 min 5 min 5 min   Neuromuscular Reeducation: (0 minutes):  Exercise/activities per grid below to improve movement, balance, coordination, kinesthetic sense, posture and/or proprioception. Required minimal verbal and manual cues to promote proper body alignment. Activity                                Therapeutic Exercise: (30 minutes):  Exercises per grid below to improve strength, endurance, range of motion, flexibility. Required minimal verbal and manual cues to promote proper body alignment.     Date:  04/30/19 Date:  05/09/19 Date:  05/13/19 Date:  05/16/19 Date:  05/20/19 Date:  05/22/19 Date:  05/28/19 Date:  06/10/19 Date:  06/12/19 Date:  06/17/19   Exercise             Walking/recumbent bike/UBE 15 min 1/2 mile- 15 min; UBE 10 min 1/2 mile; ergometer 10 min 1/2 mile without rest 3/4 mile without rest 3/4 mile without rest 1 mile without rest Ergometer x 12 min Ergometer x 12 min Ergometer x 12 min   Cat/Cow for spinal mobility     5 x 1 5 x 1 5 x 1 5 x 1 5 x 1 HEP   Active single knee to chest  5 min See manual 10 x 1 HEP HEP HEP 5 x 1 5 x 1 HEP   B gluteal/hip rotator stretching in supine  5 min See manual 30 sec x 1 ea HEP HEP HEP 5 x 1 5 x 1 HEP   Dynamic balance - tandem rhomberg  30 sec x 2 ea 30 sec x 2 ea 10 min 10 min 10 min 10 min 10 min 10 min 15 min 10 min   Dynamic balance - single leg stance 15 sec x 3 ea 15 sec x 3 ea 10 min 10 min 10 min 10 min 10 min 10 min 10 min 10 min   Home Exercise Program (HEP): as above; handouts given to patient for all exercises.   [employer] Portal  Treatment/Session Summary:    · Response to Treatment: Deronda Janina improved to -5 B and hip to 90 B  · Compliance with Program/Exercises: Compliant  · Communication/Consultation:  None today  · Equipment provided today:  None today  · Recommendations/Intent for next treatment session: Progress mobility of hips, dymamic balance, and general LE strength and endurance  Treatment Plan of Care Effective Dates:  04/30/19 TO 07/23/19  Frequency of Treatment: Continue 1 time a week for 6 weeks as progresses to more independence with gym program  Total Treatment Billable Duration: 55 minutes  PT Patient Time In/Time Out  Time In: 1000  Time Out: 524 Dr. Wayne English, PT    Future Appointments   Date Time Provider Carmela Kim   6/19/2019 10:00 AM Jericho Benitez, BRUNO [facility]   6/25/2019  3:00 PM Jericho Benitez PT [facility]   6/27/2019  9:00 AM Carlos Curiel, PT [facility]

## 2019-06-19 ENCOUNTER — HOSPITAL ENCOUNTER (OUTPATIENT)
Dept: PHYSICAL THERAPY | Age: 69
Discharge: HOME | End: 2019-06-19
Payer: MEDICARE

## 2019-06-19 PROCEDURE — 97110 THERAPEUTIC EXERCISES: CPT

## 2019-06-19 PROCEDURE — 97140 MANUAL THERAPY 1/> REGIONS: CPT

## 2019-06-19 NOTE — PROGRESS NOTES
Joanna Ferguson  : 1950  [place]  Secondary: Devon Melendez at [facility]  [address] [place] [address]  [phone]   [phone]    OUTPATIENT PHYSICAL THERAPY: Daily Treatment Note 2019  Pre-treatment Symptoms/Complaints:  Patient reports she is not longer taking Tramadol. Reports decreased general lumbar ache/pain that exacerbates as the day progresses and is best in the morning. .  Pain: Initial: Pain Intensity 1: 1  Pain Location 1: Spine, lumbar  Pain Orientation 1: Posterior, Right Post Session:  0/10   Medications Last Reviewed: 2019  Updated Objective Findings (Used abbreviations: VCT - Vertical Compression Test; LPM - Lumbar Protective Mechanism: HISL - hip/innominate/sacral/lumbar mobility):   Date:  19 Date:  19 Date:  19 Date:  19 Date:  19 Date:  19 Date:  06/10/19 Date:  19   HISL 80/90/100/100 B 85/95/105/105B 85/95/105/105B 85/95/105/105B 85/95/105/105B 85/95/105/105B 89/100/110/110L; 85/95/105/105R 89/100/110/110L; 85/95/105/105R   Hip/innominate extension -5/-5 B -5/-5 B -5/-5 B -5/-5 B -5/-5 B -5/-5 B -5/-5 B -5/-5 B   LPM Absent all diagonals Absent all diagonals Sluggish; 1/5 all diagonals Sluggish; 1/5 all diagonals Sluggish; 1/5 all diagonals Sluggish; 1/5 all diagonals Sluggish; 1/5 all diagonals Sluggish; 1/5 all diagonals   VCT 1/5; anterior shear 1/5; anterior shear 1/5; anterior shear 1/5; anterior shear 1/5; anterior shear 2/5; anterior shear 2/5; anterior shear 2/5; anterior shear   Bobo -30 B -20 B -20 B -20 B -20 B -10 B -10 B -10 B   Passive seated trunk rotation 50% B 50% B 50% B 50% B 50% B 50% B 50% B 50% B   Single leg stance less than 5 second on R leg and less than 3 seconds on L 5-10 sec B 5-10 sec B 15 sec B 15 sec B 30 sec B 30 sec B 30 sec B   Tandem Rhomberg less than 15 sec B Less 20 sec B Less 20 sec B 30 sec B 30 sec B 30 sec B 30 sec B 30 sec B      TREATMENT:   Manual Therapy (30 minutes): (Used abbreviations: SF - Superficial Fascia; BC - Bony contours; MP - muscle play; PNF - proprioceptive neuromuscular facilitation; IASTM - instrument-assisted soft tissue mobilization; MET - muscle energy technique; a/p - anterior to posterior; p/a - posterior to anterior) Manual treatment to improve soft tissue/joint mobility deficits, tissue integrity issues, trigger points and/or pain. Date:  05/09/19 Date:  05/13/19 Date:  05/16/19 Date:  05/20/19 Date:  05/22/19 Date:  05/28/19 Date:  06/10/19 Date:  06/12/19 Date:  06/17/19 Date:  06/19/19   Technique             Prone - SF/scar mobilty 5 min 5 min - 5 min 5 min 10 min 10 min 10min 10 min 15 min   Quadriceps stretching and B hip on axis 5 min 5 min 5 min 5 min 5 min 5 min 5 min 5 min 5 min 5 min    Hip distraction with flexion 5 min 5 min 5 min 5 min 5 min 5 min 10 min 10 min 5 min 5 min   Bobo/1/2 prone rectus femoris 5 min 5 min 5 min 5 min 5 min - 5 min 5 min 5 min 5 min   Therapeutic Exercise: (30 minutes):  Exercises per grid below to improve strength, endurance, range of motion, flexibility. Required minimal verbal and manual cues to promote proper body alignment.     Date:  04/30/19 Date:  05/09/19 Date:  05/13/19 Date:  05/16/19 Date:  05/20/19 Date:  05/22/19 Date:  05/28/19 Date:  06/10/19 Date:  06/12/19 Date:  06/17/19 Date:  06/19/19   Exercise              Walking/recumbent bike/UBE 15 min 1/2 mile- 15 min; UBE 10 min 1/2 mile; ergometer 10 min 1/2 mile without rest 3/4 mile without rest 3/4 mile without rest 1 mile without rest Ergometer x 12 min Ergometer x 12 min Ergometer x 12 min Ergometer x 10 min   Cat/Cow for spinal mobility     5 x 1 5 x 1 5 x 1 5 x 1 5 x 1 HEP HEP   Active single knee to chest  5 min See manual 10 x 1 HEP HEP HEP 5 x 1 5 x 1 HEP HEP   B gluteal/hip rotator stretching in supine  5 min See manual 30 sec x 1 ea HEP HEP HEP 5 x 1 5 x 1 HEP HEP   Dynamic balance - tandem rhomberg  30 sec x 2 ea 30 sec x 2 ea 10 min 10 min 10 min 10 min 10 min 10 min 15 min 10 min 10 min     Dynamic balance - single leg stance 15 sec x 3 ea 15 sec x 3 ea 10 min 10 min 10 min 10 min 10 min 10 min 10 min 10 min 10 min   Home Exercise Program (HEP): as above; handouts given to patient for all exercises.   [employer] Portal  Treatment/Session Summary:    · Response to Treatment: Ruthine Rosalbatone improved to -5 B and hip to 90 B  · Compliance with Program/Exercises: Compliant  · Communication/Consultation:  None today  · Equipment provided today:  None today  · Recommendations/Intent for next treatment session: Progress mobility of hips, dymamic balance, and general LE strength and endurance  Treatment Plan of Care Effective Dates:  06/19/19 TO 08/01/19  Frequency of Treatment: Continue 1 time a week for 6 weeks as progresses to more independence with gym program  Total Treatment Billable Duration: 60 minutes  PT Patient Time In/Time Out  Time In: 1000  Time Out: 1401 Jonelle Vera, PT    Future Appointments   Date Time Provider Carmela Kim   6/27/2019  9:00 AM Yina Humphries, PT [facility]   7/15/2019 11:00 AM Nubia Curiel, PT [facility]

## 2019-06-19 NOTE — THERAPY RECERTIFICATION
Joanna Johnson Cellar  : 1950  [place]  Secondary: Devon Melendez at [facility]  [address] [place] [address]  [phone]   [phone]       OUTPATIENT PHYSICAL THERAPY:Recertification    ICD-10: Treatment Diagnosis: Spinal stenosis, lumbar region (M48.06); Fusion of spine, lumbar region (M43.26); Difficulty walking (R26.2)  Precautions/Allergies: Codeine   MD Orders: Eval and treat MEDICAL/REFERRING DIAGNOSIS:  Back pain [M54.9]   DATE OF ONSET: 19  REFERRING PHYSICIAN: Sheryle Colon, MD  RETURN PHYSICIAN APPOINTMENT:    RE-CERTIFICATON (): Patient has now attended 10 out of 10 scheduled physical therapy visits with initial evaluation completed on 19. She has shown excellent progress with hip mobility and overall balance but still exhibits some difficulty with dynamic balance exhibiting medical necessity and need for skilled level of physical therapy. INITIAL ASSESSMENT:  Ms. Parrish Hernandez presents with low back pain status post lumbar fusion due to worsening spinal stenosis with objective findings including loss of mobility through lumbopelvic region, thoracic/costal cage and B hip, generalized proximal hip and trunk weakness, dynamic balance deficits with limitations in functional mobility exhibiting medical necessity and need for skilled level of physical therapy. PROBLEM LIST (Impacting functional limitations):  1. Decreased ADL/functional activities specificially with all functional mobility  2. Outcome measure score of 11/50 on Modified Oswestry with moderate effect of low back on patient's ability to manage every day life activities  3. Decreased strength through trunk and B LE limiting functional mobility  4.  Decreased balance with decreased dynamic center of gravity control and decreased gaze stabilization with functional activities including gait, stairs, curbs, and mobility at home INTERVENTIONS PLANNED:  1. Patient education including pathophysiology of post op plan and general guidelines  2. Back education & training (sleeping, sitting and standing positions, posture re-education and body mechanics)  3. ROM/mobility of spine, specifically thoracic and B hips  4. Manual therapy including soft tissue mobilizations, functional joint mobilizations and neuromuscular re-education to lumbopelvic region  5. Neuromuscular re-education with focus on initiation/strength and endurance of trunk and motor control with B LE  6. Therapeutic exercises including general endurance and strengthening of trunk and B LE  7. Gait training with focus on correcting deficits, sequencing and erendira  8. Balance exercise - focused on standing dynamic balance with greatest focus on dynamic center of gravity control  9. Instructions of home exercise program (HEP)   TREATMENT PLAN:  Treatment Plan of Care Effective Dates: 06/19/19 TO 08/01/19  Frequency of Treatment: Continue 1 time a week for 6 weeks as progresses to more independence with gym program  GOALS: (Goals have been discussed and agreed upon with patient.)  Discharge Goals: Time Frame: 12 weeks  1. Patient will be independent and able to tolerate all ADLs without increase of LBP (goal ongoing)  2. Patient will be independent with all functional mobility in community without deficits (goal ongoing)  3. Patient will score less than 20/50 on Modified Oswestry with minimum effect of low back on patient's ability to manage every day life activities (goal ongoing)  OUTCOME MEASURE:   Tool Used: Modified Oswestry Low Back Pain Questionnaire  Score:  Initial: 31/50 (04/30/19)  Most Recent: 11/50 (Date: 06/19/19)   Interpretation of Score: Each section is scored on a 0-5 scale, 5 representing the greatest disability. The scores of each section are added together for a total score of 50.     MEDICAL NECESSITY:   · Patient is expected to demonstrate progress in strength, range of motion and balance to increase independence with functional mobility and improve safety during community activities. · Rehabilitation Potential For Stated Goals: Excellent  REASON FOR SERVICES/OTHER COMMENTS:  · Patient continues to require skilled intervention due to  loss of mobility through lumbopelvic region, thoracic/costal cage and B hip, generalized proximal hip and trunk weakness, dynamic balance deficits with limitations in functional mobility. PT Patient Time In/Time Out  Time In: 1000  Time Out: 1100    Regarding Joanna Herrera's therapy, I certify that the treatment plan above will be carried out by a therapist or under their direction. Thank you for this referral,  Unk Favre, PT     Referring Physician Signature: Dru Mcghee MD _______________________________ Date _____________               Ambulatory/Rehab Services H2 Model Falls Risk Assessment    Risk Factors:       No Risk Factors Identified Ability to Rise from Chair:       (0)  Ability to rise in a single movement    Falls Prevention Plan:       No modifications necessary   Total: (5 or greater = High Risk): 0    ©2010 [facility] of [address] [place] Patent #[id number]. Federal Law prohibits the replication, distribution or use without written permission from [facility] of [employer]   Current Medications:      Current Outpatient Medications:     hydrocortisone (ANUSOL-HC) 2.5 % rectal cream, Insert  into rectum four (4) times daily. , Disp: 30 g, Rfl: 2    estradiol (ESTRACE) 0.5 mg tablet, Take 1 Tab by mouth daily. , Disp: 30 Tab, Rfl: 11    hydrochlorothiazide (HYDRODIURIL) 25 mg tablet, Take 25 mg by mouth daily. , Disp: , Rfl:     atorvastatin (LIPITOR) 20 mg tablet, Take  by mouth daily. , Disp: , Rfl:     Zolpidem 10 mg subl, by SubLINGual route., Disp: , Rfl:     Calcium-Cholecalciferol, D3, (CALCIUM 600 WITH VITAMIN D3) 600 mg(1,500mg) -400 unit cap, Take  by mouth., Disp: , Rfl:     Biotin 2,500 mcg cap, Take  by mouth., Disp: , Rfl:     olmesartan (BENICAR) 20 mg tablet, Take 40 mg by mouth daily. , Disp: , Rfl:     aspirin delayed-release (ASPIR-LOW) 81 mg tablet, Take 81 mg by mouth daily. , Disp: , Rfl:    Date Last Reviewed:  6/19/2019   EXAMINATION:    Date:  05/09/19 Date:  05/13/19 Date:  05/16/19 Date:  05/20/19 Date:  05/22/19 Date:  05/28/19 Date:  06/10/19 Date:  06/19/19   HISL 80/90/100/100 B 85/95/105/105B 85/95/105/105B 85/95/105/105B 85/95/105/105B 85/95/105/105B 89/100/110/110L; 85/95/105/105R 89/100/110/110L; 85/95/105/105R   Hip/innominate extension -5/-5 B -5/-5 B -5/-5 B -5/-5 B -5/-5 B -5/-5 B -5/-5 B -5/-5 B   LPM Absent all diagonals Absent all diagonals Sluggish; 1/5 all diagonals Sluggish; 1/5 all diagonals Sluggish; 1/5 all diagonals Sluggish; 1/5 all diagonals Sluggish; 1/5 all diagonals Sluggish; 1/5 all diagonals   VCT 1/5; anterior shear 1/5; anterior shear 1/5; anterior shear 1/5; anterior shear 1/5; anterior shear 2/5; anterior shear 2/5; anterior shear 2/5; anterior shear   Bobo -30 B -20 B -20 B -20 B -20 B -10 B -10 B -10 B   Passive seated trunk rotation 50% B 50% B 50% B 50% B 50% B 50% B 50% B 50% B   Single leg stance less than 5 second on R leg and less than 3 seconds on L 5-10 sec B 5-10 sec B 15 sec B 15 sec B 30 sec B 30 sec B 30 sec B   Tandem Rhomberg less than 15 sec B Less 20 sec B Less 20 sec B 30 sec B 30 sec B 30 sec B 30 sec B 30 sec B           Alejandrina Burnham, PT    Future Appointments   Date Time Provider Carmela Judy   6/27/2019  9:00 AM Delon Gibbons, PT [facility]   7/15/2019 11:00 AM Messer, Gardner Riedel, PT [facility]

## 2019-06-25 ENCOUNTER — APPOINTMENT (OUTPATIENT)
Dept: PHYSICAL THERAPY | Age: 69
End: 2019-06-25
Payer: MEDICARE

## 2019-06-27 ENCOUNTER — HOSPITAL ENCOUNTER (OUTPATIENT)
Dept: PHYSICAL THERAPY | Age: 69
Discharge: HOME | End: 2019-06-27
Payer: MEDICARE

## 2019-06-27 PROCEDURE — 97110 THERAPEUTIC EXERCISES: CPT

## 2019-06-27 PROCEDURE — 97140 MANUAL THERAPY 1/> REGIONS: CPT

## 2019-06-27 NOTE — PROGRESS NOTES
Joanna Luna  : 1950  [place]  Secondary: Devon Melendez at [facility]  [address] [place] [address]  [phone]   [phone]    OUTPATIENT PHYSICAL THERAPY: Daily Treatment Note 2019  Pre-treatment Symptoms/Complaints:  Patient reports she travelled to [place] this weekend and all the prolonged flying/sitting made her much more stiff. She does not report any increase of LBP but rather tightness in the lumbopelvic region that eases with mobility. Pain: Initial: Pain Intensity 1: 2  Pain Location 1: Spine, lumbar  Pain Orientation 1: Mid, Right, Left Post Session:  0/10   Medications Last Reviewed: 2019  Updated Objective Findings (Used abbreviations: VCT - Vertical Compression Test; LPM - Lumbar Protective Mechanism: HISL - hip/innominate/sacral/lumbar mobility):   Date:  19    HISL 89/100/110/110L; 85/95/105/105R    Hip/innominate extension -5/-5 B    LPM Sluggish; 1/5 all diagonals    VCT 2/5; anterior shear    Bobo -10 B    Passive seated trunk rotation 50% B    Single leg stance 30 sec B    Tandem Rhomberg 30 sec B       TREATMENT:   Manual Therapy (30 minutes): (Used abbreviations: SF - Superficial Fascia; BC - Bony contours; MP - muscle play; PNF - proprioceptive neuromuscular facilitation; IASTM - instrument-assisted soft tissue mobilization; MET - muscle energy technique; a/p - anterior to posterior; p/a - posterior to anterior) Manual treatment to improve soft tissue/joint mobility deficits, tissue integrity issues, trigger points and/or pain.    Date:  19 Date:  19   Technique     Prone - SF/scar mobilty 15 min 15 min   Quadriceps stretching and B hip on axis 5 min  5 min   Hip distraction with flexion 5 min 5 min   Bobo/1/2 prone rectus femoris 5 min 5 min   Therapeutic Exercise: (20 minutes):  Exercises per grid below to improve strength, endurance, range of motion, flexibility. Required minimal verbal and manual cues to promote proper body alignment. Date:  06/19/19 Date:  06/27/19   Exercise     Walking/recumbent bike/UBE Ergometer x 10 min Ergometer x 10 min   Cat/Cow for spinal mobility HEP 5 x 1   Active single knee to chest HEP HEP   B gluteal/hip rotator stretching in supine HEP HEP   Dynamic balance - tandem rhomberg + stepping 10 min   5 min   Dynamic balance - single leg stance 10 min 5 min   Home Exercise Program (HEP): as above; handouts given to patient for all exercises.   [employer] Portal  Treatment/Session Summary:    · Response to Treatment: Mattie Hunton improved to -5 B and hip to 90 B  · Compliance with Program/Exercises: Compliant  · Communication/Consultation:  None today  · Equipment provided today:  None today  · Recommendations/Intent for next treatment session: Progress mobility of hips, dymamic balance, and general LE strength and endurance  Treatment Plan of Care Effective Dates:  06/19/19 TO 08/01/19  Frequency of Treatment: Continue 1 time a week for 4 weeks as progresses to more independence with gym program  Total Treatment Billable Duration: 50 minutes  PT Patient Time In/Time Out  Time In: 0915  Time Out: 1005  Tracy Fletcher PT    Future Appointments   Date Time Provider Carmela Kim   7/15/2019 11:00 AM Cyndi Curiel, PT [facility]

## 2019-07-10 ENCOUNTER — APPOINTMENT (OUTPATIENT)
Dept: PHYSICAL THERAPY | Age: 69
End: 2019-07-10
Payer: MEDICARE

## 2019-07-15 ENCOUNTER — HOSPITAL ENCOUNTER (OUTPATIENT)
Dept: PHYSICAL THERAPY | Age: 69
Discharge: HOME | End: 2019-07-15
Payer: MEDICARE

## 2019-07-15 PROCEDURE — 97140 MANUAL THERAPY 1/> REGIONS: CPT

## 2019-07-15 PROCEDURE — 97110 THERAPEUTIC EXERCISES: CPT

## 2019-07-15 NOTE — THERAPY DISCHARGE
Joanna Panda Orf  : 1950  [place]  Secondary: Devon Melendez at [facility]  [address] [place] [address]  [phone]   [phone]       OUTPATIENT PHYSICAL THERAPY:Discharge Summary 7/15/2019   ICD-10: Treatment Diagnosis: Spinal stenosis, lumbar region (M48.06); Fusion of spine, lumbar region (M43.26); Difficulty walking (R26.2)  Precautions/Allergies: Codeine   MD Orders: Eval and treat MEDICAL/REFERRING DIAGNOSIS:  Back pain [M54.9]   DATE OF ONSET: 19  REFERRING PHYSICIAN: Carri Bales MD  RETURN PHYSICIAN APPOINTMENT: 05/15/19   DISCHARGE SUMMARY (07/15/19): Patient has now attended 12 out of 12 scheduled physical therapy visits with initial evaluation completed on 19. She has now achieved pain free independence with all ADLs and functional mobility and is independent with home program. Discharge at this time with all goals achieved. RE-CERTIFICATON (25/41/15): Patient has now attended 10 out of 10 scheduled physical therapy visits with initial evaluation completed on 19. She has shown excellent progress with hip mobility and overall balance but still exhibits some difficulty with dynamic balance exhibiting medical necessity and need for skilled level of physical therapy. INITIAL ASSESSMENT:  Ms. Crystal Wilson presents with low back pain status post lumbar fusion due to worsening spinal stenosis with objective findings including loss of mobility through lumbopelvic region, thoracic/costal cage and B hip, generalized proximal hip and trunk weakness, dynamic balance deficits with limitations in functional mobility exhibiting medical necessity and need for skilled level of physical therapy. PLAN: Discharge to home program  GOALS: (Goals have been discussed and agreed upon with patient.)  Discharge Goals:  1.  Patient will be independent and able to tolerate all ADLs without increase of LBP (goal achieved)  2. Patient will be independent with all functional mobility in community without deficits (goal achieved)  3. Patient will score less than 20/50 on Modified Oswestry with minimum effect of low back on patient's ability to manage every day life activities (goal achieved)  OUTCOME MEASURE:   Tool Used: Modified Oswestry Low Back Pain Questionnaire  Score:  Initial: 31/50 (04/30/19)  Most Recent: 12/50 (Date: 07/15/19)   Interpretation of Score: Each section is scored on a 0-5 scale, 5 representing the greatest disability. The scores of each section are added together for a total score of 50. · PT Patient Time In/Time Out  Time In: 1100  Time Out: 1200               Ambulatory/Rehab Services H2 Model Falls Risk Assessment    Risk Factors:       No Risk Factors Identified Ability to Rise from Chair:       (0)  Ability to rise in a single movement    Falls Prevention Plan:       No modifications necessary   Total: (5 or greater = High Risk): 0    ©2010 [facility] of [employer]. All Rights Reserved. [facility] for[[place]] Patent #[id number]. Federal Law prohibits the replication, distribution or use without written permission from [facility] [employer]   Current Medications:      Current Outpatient Medications:     hydrocortisone (ANUSOL-HC) 2.5 % rectal cream, Insert  into rectum four (4) times daily. , Disp: 30 g, Rfl: 2    estradiol (ESTRACE) 0.5 mg tablet, Take 1 Tab by mouth daily. , Disp: 30 Tab, Rfl: 11    hydrochlorothiazide (HYDRODIURIL) 25 mg tablet, Take 25 mg by mouth daily. , Disp: , Rfl:     atorvastatin (LIPITOR) 20 mg tablet, Take  by mouth daily. , Disp: , Rfl:     Zolpidem 10 mg subl, by SubLINGual route., Disp: , Rfl:     Calcium-Cholecalciferol, D3, (CALCIUM 600 WITH VITAMIN D3) 600 mg(1,500mg) -400 unit cap, Take  by mouth., Disp: , Rfl:     Biotin 2,500 mcg cap, Take  by mouth., Disp: , Rfl:     olmesartan (BENICAR) 20 mg tablet, Take 40 mg by mouth daily. , Disp: , Rfl:    aspirin delayed-release (ASPIR-LOW) 81 mg tablet, Take 81 mg by mouth daily. , Disp: , Rfl:    Date Last Reviewed:  7/15/2019   EXAMINATION:    Date:  07/15/19   HISL 90/100/110/110L; 85/95/105/105R   Hip/innominate extension -5/-5 B   LPM Sluggish; 1/5 all diagonals   VCT 2/5; anterior shear   Bobo -10 R; 0 L   Passive seated trunk rotation 50% B   Single leg stance 30 sec B   Tandem Rhomberg 30 sec B           Layla Curiel, PT    No future appointments.

## 2019-07-15 NOTE — PROGRESS NOTES
Joanna De La Cruz Eve  : 1950  [place]  Secondary: Devon Melendez at [facility]  [address] [place] [address]  [phone]   [phone]    OUTPATIENT PHYSICAL THERAPY: Daily Treatment Note 7/15/2019  Pre-treatment Symptoms/Complaints:  Patient reports doing well with the only symptoms at all being when she lays down at night. Pain: Initial:   Post Session:  0/10   Medications Last Reviewed: 7/15/2019  Updated Objective Findings (Used abbreviations: VCT - Vertical Compression Test; LPM - Lumbar Protective Mechanism: HISL - hip/innominate/sacral/lumbar mobility):   Date:  07/15/19   HISL 90/100/110/110L; 85/95/105/105R   Hip/innominate extension -5/-5 B   LPM Sluggish; 1/5 all diagonals   VCT 2/5; anterior shear   Bobo -10 R; 0 L   Passive seated trunk rotation 50% B   Single leg stance 30 sec B   Tandem Rhomberg 30 sec B      TREATMENT:   Manual Therapy (25 minutes): (Used abbreviations: SF - Superficial Fascia; BC - Bony contours; MP - muscle play; PNF - proprioceptive neuromuscular facilitation; IASTM - instrument-assisted soft tissue mobilization; MET - muscle energy technique; a/p - anterior to posterior; p/a - posterior to anterior) Manual treatment to improve soft tissue/joint mobility deficits, tissue integrity issues, trigger points and/or pain. Date:  19 Date:  19 Date:  07/15/19   Technique      Prone - SF/scar mobilty 15 min 15 min 5 min   Quadriceps stretching and B hip on axis 5 min  5 min 5 min   Hip distraction with flexion 5 min 5 min 5 min   Bobo/1/2 prone rectus femoris 5 min 5 min 10 min   Therapeutic Exercise: (35 minutes):  Exercises per grid below to improve strength, endurance, range of motion, flexibility. Required minimal verbal and manual cues to promote proper body alignment.     Date:  19 Date:  19 Date:  07/15/19   Exercise      Walking/recumbent bike/UBE Ergometer x 10 min Ergometer x 10 min 3/4 mile   Cat/Cow for spinal mobility HEP 5 x 1 5 x 1   Active single knee to chest HEP HEP 5 x 1   B gluteal/hip rotator stretching in supine HEP HEP HEP   Dynamic balance - tandem rhomberg + stepping 10 min   5 min 5 min   Dynamic balance - single leg stance 10 min 5 min 10 min   Home Exercise Program (HEP): as above; handouts given to patient for all exercises. [email or web address] Portal  Treatment/Session Summary:    · Response to Treatment: Elio Ricketts improved to -5 B and hip to 90 B  · Compliance with Program/Exercises: Compliant  · Communication/Consultation:  None today  · Equipment provided today:  None today  · Recommendations/Intent for next treatment session: See discharge summary  Total Treatment Billable Duration: 60 minutes  PT Patient Time In/Time Out  Time In: 1100  Time Out: 1200  Layla Curiel PT    No future appointments.

## 2022-02-02 PROBLEM — R76.8 BIOLOGICAL FALSE POSITIVE RPR TEST: Status: ACTIVE | Noted: 2022-02-02

## 2022-03-19 PROBLEM — R76.8 BIOLOGICAL FALSE POSITIVE RPR TEST: Status: ACTIVE | Noted: 2022-02-02

## 2022-04-26 ENCOUNTER — APPOINTMENT (RX ONLY)
Dept: URBAN - METROPOLITAN AREA CLINIC 329 | Facility: CLINIC | Age: 72
Setting detail: DERMATOLOGY
End: 2022-04-26

## 2022-07-06 ENCOUNTER — APPOINTMENT (RX ONLY)
Dept: URBAN - METROPOLITAN AREA CLINIC 329 | Facility: CLINIC | Age: 72
Setting detail: DERMATOLOGY
End: 2022-07-06

## 2022-07-06 DIAGNOSIS — L82.1 OTHER SEBORRHEIC KERATOSIS: ICD-10-CM

## 2022-07-06 PROCEDURE — ? COUNSELING

## 2022-07-06 PROCEDURE — 99212 OFFICE O/P EST SF 10 MIN: CPT

## 2022-07-06 PROCEDURE — ? DEFER

## 2022-07-06 ASSESSMENT — LOCATION DETAILED DESCRIPTION DERM
LOCATION DETAILED: SUPERIOR MID FOREHEAD
LOCATION DETAILED: EPIGASTRIC SKIN
LOCATION DETAILED: RIGHT LATERAL MALAR CHEEK
LOCATION DETAILED: RIGHT CENTRAL MALAR CHEEK
LOCATION DETAILED: LEFT SUPERIOR MEDIAL FOREHEAD
LOCATION DETAILED: LEFT INFERIOR CENTRAL MALAR CHEEK
LOCATION DETAILED: INFERIOR THORACIC SPINE
LOCATION DETAILED: LEFT CENTRAL MALAR CHEEK

## 2022-07-06 ASSESSMENT — LOCATION ZONE DERM
LOCATION ZONE: FACE
LOCATION ZONE: TRUNK

## 2022-07-06 ASSESSMENT — LOCATION SIMPLE DESCRIPTION DERM
LOCATION SIMPLE: UPPER BACK
LOCATION SIMPLE: LEFT CHEEK
LOCATION SIMPLE: SUPERIOR FOREHEAD
LOCATION SIMPLE: ABDOMEN
LOCATION SIMPLE: LEFT FOREHEAD
LOCATION SIMPLE: RIGHT CHEEK

## 2022-08-04 ENCOUNTER — APPOINTMENT (RX ONLY)
Dept: URBAN - METROPOLITAN AREA CLINIC 329 | Facility: CLINIC | Age: 72
Setting detail: DERMATOLOGY
End: 2022-08-04

## 2022-08-04 DIAGNOSIS — L91.8 OTHER HYPERTROPHIC DISORDERS OF THE SKIN: ICD-10-CM

## 2022-08-04 DIAGNOSIS — L82.1 OTHER SEBORRHEIC KERATOSIS: ICD-10-CM

## 2022-08-04 DIAGNOSIS — D22 MELANOCYTIC NEVI: ICD-10-CM

## 2022-08-04 PROBLEM — D22.39 MELANOCYTIC NEVI OF OTHER PARTS OF FACE: Status: ACTIVE | Noted: 2022-08-04

## 2022-08-04 PROBLEM — D22.71 MELANOCYTIC NEVI OF RIGHT LOWER LIMB, INCLUDING HIP: Status: ACTIVE | Noted: 2022-08-04

## 2022-08-04 PROCEDURE — ? ELECTRODESICCATION

## 2022-08-04 PROCEDURE — ? BENIGN DESTRUCTION

## 2022-08-04 PROCEDURE — ? FULL BODY SKIN EXAM - DECLINED

## 2022-08-04 PROCEDURE — 99212 OFFICE O/P EST SF 10 MIN: CPT | Mod: 25

## 2022-08-04 PROCEDURE — ? COUNSELING

## 2022-08-04 PROCEDURE — 17111 DESTRUCTION B9 LESIONS 15/>: CPT

## 2022-08-04 ASSESSMENT — LOCATION DETAILED DESCRIPTION DERM
LOCATION DETAILED: LEFT MEDIAL SUPERIOR EYELID
LOCATION DETAILED: RIGHT SUPERIOR CENTRAL MALAR CHEEK
LOCATION DETAILED: RIGHT ANTERIOR MEDIAL PROXIMAL THIGH
LOCATION DETAILED: RIGHT SUPERIOR MEDIAL MALAR CHEEK
LOCATION DETAILED: LEFT INFERIOR LATERAL MALAR CHEEK
LOCATION DETAILED: LEFT SUPERIOR MEDIAL FOREHEAD
LOCATION DETAILED: LEFT SUPERIOR CENTRAL MALAR CHEEK
LOCATION DETAILED: LEFT LATERAL SUPERIOR EYELID
LOCATION DETAILED: LEFT ANTERIOR AXILLA
LOCATION DETAILED: LEFT CENTRAL MALAR CHEEK
LOCATION DETAILED: RIGHT CENTRAL MALAR CHEEK
LOCATION DETAILED: LEFT INFERIOR CENTRAL MALAR CHEEK
LOCATION DETAILED: LEFT MEDIAL MALAR CHEEK
LOCATION DETAILED: RIGHT MEDIAL MALAR CHEEK
LOCATION DETAILED: RIGHT LATERAL BUCCAL CHEEK
LOCATION DETAILED: RIGHT LATERAL BUCCAL CHEEK
LOCATION DETAILED: LEFT AXILLARY VAULT
LOCATION DETAILED: LEFT SUPERIOR LATERAL BUCCAL CHEEK

## 2022-08-04 ASSESSMENT — LOCATION SIMPLE DESCRIPTION DERM
LOCATION SIMPLE: RIGHT CHEEK
LOCATION SIMPLE: RIGHT THIGH
LOCATION SIMPLE: RIGHT CHEEK
LOCATION SIMPLE: LEFT FOREHEAD
LOCATION SIMPLE: LEFT CHEEK
LOCATION SIMPLE: LEFT AXILLARY VAULT
LOCATION SIMPLE: LEFT SUPERIOR EYELID
LOCATION SIMPLE: LEFT ANTERIOR AXILLA

## 2022-08-04 ASSESSMENT — LOCATION ZONE DERM
LOCATION ZONE: FACE
LOCATION ZONE: FACE
LOCATION ZONE: LEG
LOCATION ZONE: EYELID
LOCATION ZONE: AXILLAE

## 2022-08-04 NOTE — PROCEDURE: ELECTRODESICCATION
Detail Level: Simple
Anesthesia Type: 1% lidocaine with epinephrine
Include Z78.9 (Other Specified Conditions Influencing Health Status) As An Associated Diagnosis?: No
Post-Care Instructions: I reviewed with the patient in detail post-care instructions. Patient is to wear sunprotection, and avoid picking at any of the treated lesions. Pt may apply Vaseline to crusted or scabbing areas
Consent: The patient's consent was obtained including but not limited to risks of crusting, scabbing, blistering, scarring, darker or lighter pigmentary change, recurrence, incomplete removal and infection.
Medical Necessity Clause: This procedure was medically necessary because the lesions that were treated were:
Medical Necessity Information: It is in your best interest to select a reason for this procedure from the list below. All of these items fulfill various CMS LCD requirements except the new and changing color options.

## 2022-08-04 NOTE — PROCEDURE: MIPS QUALITY
Quality 394a: Meningococcal Immunizations For Adolescents: Patient had one dose of meningococcal vaccine (serogroups A, C, W, Y) on or between the patient's 11th and 13th birthdays.
Quality 394c: Hpv Vaccine For Adolescents: Patient has had at least two HPV vaccines (with at least 146 days between the two) OR three HPV vaccines on or between the patient’s 9th and 13th birthdays.
Quality 110: Preventive Care And Screening: Influenza Immunization: Influenza Immunization Administered during Influenza season
Quality 394b: Td/Tdap Immunizations For Adolescents: Patient had one tetanus, diphtheria toxoids and acellular pertussis vaccine (Tdap) on or between the patient's 10th and 13th birthdays.
Quality 111:Pneumonia Vaccination Status For Older Adults: Pneumococcal vaccine administered on or after patient’s 60th birthday and before the end of the measurement period
Detail Level: Detailed

## 2022-08-04 NOTE — PROCEDURE: BENIGN DESTRUCTION
Add 52 Modifier (Optional): no
Medical Necessity Clause: This procedure was medically necessary because the lesions that were treated were:
Anesthesia Volume In Cc: 0.5
Detail Level: Detailed
Treatment Number (Will Not Render If 0): 0
Consent: The patient's consent was obtained including but not limited to risks of crusting, scabbing, blistering, scarring, darker or lighter pigmentary change, recurrence, incomplete removal and infection.
Medical Necessity Information: It is in your best interest to select a reason for this procedure from the list below. All of these items fulfill various CMS LCD requirements except the new and changing color options.
Post-Care Instructions: I reviewed with the patient in detail post-care instructions. Patient is to wear sunprotection, and avoid picking at any of the treated lesions. Pt may apply Vaseline to crusted or scabbing areas.

## 2022-12-07 RX ORDER — ESTRADIOL 0.5 MG/1
TABLET ORAL
Qty: 90 TABLET | Refills: 3 | OUTPATIENT
Start: 2022-12-07

## 2023-05-22 ENCOUNTER — APPOINTMENT (OUTPATIENT)
Dept: GENERAL RADIOLOGY | Age: 73
End: 2023-05-22
Payer: MEDICARE

## 2023-05-22 ENCOUNTER — HOSPITAL ENCOUNTER (EMERGENCY)
Age: 73
Discharge: HOME | End: 2023-05-22
Attending: EMERGENCY MEDICINE
Payer: MEDICARE

## 2023-05-22 VITALS
SYSTOLIC BLOOD PRESSURE: 148 MMHG | HEART RATE: 71 BPM | OXYGEN SATURATION: 95 % | DIASTOLIC BLOOD PRESSURE: 82 MMHG | BODY MASS INDEX: 26.22 KG/M2 | RESPIRATION RATE: 16 BRPM | TEMPERATURE: 98.6 F | HEIGHT: 63 IN | WEIGHT: 148 LBS

## 2023-05-22 DIAGNOSIS — S40.011A CONTUSION OF MULTIPLE SITES OF RIGHT SHOULDER AND UPPER ARM, INITIAL ENCOUNTER: ICD-10-CM

## 2023-05-22 DIAGNOSIS — S60.221A CONTUSION OF RIGHT HAND, INITIAL ENCOUNTER: ICD-10-CM

## 2023-05-22 DIAGNOSIS — M54.32 BILATERAL SCIATICA: ICD-10-CM

## 2023-05-22 DIAGNOSIS — S30.0XXA LUMBAR CONTUSION, INITIAL ENCOUNTER: ICD-10-CM

## 2023-05-22 DIAGNOSIS — S40.021A CONTUSION OF MULTIPLE SITES OF RIGHT SHOULDER AND UPPER ARM, INITIAL ENCOUNTER: ICD-10-CM
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
## 2023-05-22 DIAGNOSIS — M54.31 BILATERAL SCIATICA: ICD-10-CM

## 2023-05-22 PROCEDURE — 99283 EMERGENCY DEPT VISIT LOW MDM: CPT

## 2023-05-22 PROCEDURE — 73130 X-RAY EXAM OF HAND: CPT

## 2023-05-22 PROCEDURE — 73030 X-RAY EXAM OF SHOULDER: CPT

## 2023-05-22 PROCEDURE — 72220 X-RAY EXAM SACRUM TAILBONE: CPT

## 2023-05-22 PROCEDURE — 72100 X-RAY EXAM L-S SPINE 2/3 VWS: CPT

## 2023-05-22 ASSESSMENT — ENCOUNTER SYMPTOMS
NAUSEA: 0
BOWEL INCONTINENCE: 0
VOMITING: 0

## 2023-05-22 ASSESSMENT — PAIN - FUNCTIONAL ASSESSMENT: PAIN_FUNCTIONAL_ASSESSMENT: 0-10

## 2023-05-22 ASSESSMENT — PAIN SCALES - GENERAL
PAINLEVEL_OUTOF10: 6
PAINLEVEL_OUTOF10: 4

## 2023-05-22 ASSESSMENT — LIFESTYLE VARIABLES
HOW MANY STANDARD DRINKS CONTAINING ALCOHOL DO YOU HAVE ON A TYPICAL DAY: 1 OR 2
HOW OFTEN DO YOU HAVE A DRINK CONTAINING ALCOHOL: MONTHLY OR LESS

## 2023-05-22 NOTE — ED PROVIDER NOTES
No fracture or dislocation is identified. Mild degenerative changes are seen throughout the glenohumeral and AC joints. The visualized lung field is unremarkable. Impression    1. Chronic degenerative changes without acute abnormality. XR LUMBAR SPINE (2-3 VIEWS)    Narrative    Exam: XR LUMBAR SPINE (2-3 VIEWS) on 5/22/2023 4:42 PM    Clinical History: The Female patient is 68years old  presenting for pain  following fall. Comparison:  None    Findings:  3 views were obtained. Five lumbar-type vertebral bodies are  demonstrated. Normal vertebral alignment and lordosis are maintained. There is posterior  pedicular arden and screw fusion bridging L3 through S1. No plain film evidence of  hardware loosening is seen. Associated degenerative disc height loss is seen  throughout the lower lumbar segments with mild endplate spondylosis. There is no  significant facet arthropathy. Impression    1. Fusion hardware bridging L3 through S1 without evidence of acute abnormality    CPT code(s) 72585         XR SACRUM COCCYX (MIN 2 VIEWS)    Narrative    Exam: XR SACRUM COCCYX (MIN 2 VIEWS) on 5/22/2023 4:49 PM    Clinical History: The Female patient is 68years old  presenting for pain  following fall. Comparison:  none    Findings:    3 views of the sacrum and coccyx were obtained. No fracture or dislocation is identified. The visualized sacral joints are  unremarkable. There are fusion changes throughout the lower lumbar spine with hardware in  place and associated disc height loss. Impression    1. No acute osseous or joint abnormalities. XR HAND RIGHT (MIN 3 VIEWS)    Narrative    EXAMINATION: Three-view right hand    Date: 5/22/2023 5:04 PM    Comparison: None    Clinical History: Fall. Pain         Findings: There is no fracture, subluxation, or dislocation. . Decreased joint space of the IP joint first digit with osteophyte formation.   Decreased joint

## 2023-05-22 NOTE — ED TRIAGE NOTES
On Thursday, pt fell on her back playing [employer] ball. Pt is c/o lower back pain radiating into her hips, right shoulder pain, and right 3rd digit pain. Denies numbness to perineum or incontinence.

## 2023-05-22 NOTE — ED NOTES
I have reviewed discharge instructions with the patient. The patient verbalized understanding. Patient left ED via Discharge Method: ambulatory to Home with self. Opportunity for questions and clarification provided. Patient given 0 scripts. To continue your aftercare when you leave the hospital, you may receive an automated call from our care team to check in on how you are doing. This is a free service and part of our promise to provide the best care and service to meet your aftercare needs.  If you have questions, or wish to unsubscribe from this service please call [phone]. Thank you for Choosing our [facility] Emergency [facility].        Odilon Khalil RN  05/22/23 2419

## 2023-05-22 NOTE — DISCHARGE INSTRUCTIONS
No fractures or dislocations identified on her x-ray  May take Tylenol or ibuprofen as needed for pain  Return to the ER for any new, worsening or any symptoms

## 2023-05-22 NOTE — ED NOTES
I have reviewed discharge instructions with the patient. The patient verbalized understanding. Patient left ED via Discharge Method: ambulatory to Home with (self,). Opportunity for questions and clarification provided. Patient given 0 scripts. To continue your aftercare when you leave the hospital, you may receive an automated call from our care team to check in on how you are doing. This is a free service and part of our promise to provide the best care and service to meet your aftercare needs.  If you have questions, or wish to unsubscribe from this service please call [phone]. Thank you for Choosing our [facility] Emergency Department.        Rizwan Hagen RN  05/22/23 7588

## 2023-12-06 RX ORDER — ESTRADIOL 0.5 MG/1
0.5 TABLET ORAL DAILY
Qty: 90 TABLET | Refills: 3 | OUTPATIENT
Start: 2023-12-06

## 2024-01-29 ENCOUNTER — OFFICE VISIT (OUTPATIENT)
Dept: OBGYN CLINIC | Age: 74
End: 2024-01-29
Payer: MEDICARE

## 2024-01-29 VITALS
SYSTOLIC BLOOD PRESSURE: 138 MMHG | BODY MASS INDEX: 28.7 KG/M2 | HEIGHT: 63 IN | DIASTOLIC BLOOD PRESSURE: 74 MMHG | WEIGHT: 162 LBS

## 2024-01-29 DIAGNOSIS — E89.40 SURGICAL MENOPAUSE: ICD-10-CM

## 2024-01-29 DIAGNOSIS — Z12.31 ENCOUNTER FOR SCREENING MAMMOGRAM FOR MALIGNANT NEOPLASM OF BREAST: ICD-10-CM

## 2024-01-29 DIAGNOSIS — Z11.8 SCREENING EXAMINATION FOR PARASITIC INFECTION: ICD-10-CM

## 2024-01-29 DIAGNOSIS — Z01.419 WELL WOMAN EXAM WITH ROUTINE GYNECOLOGICAL EXAM: Primary | ICD-10-CM

## 2024-01-29 DIAGNOSIS — Z11.3 ROUTINE SCREENING FOR STI (SEXUALLY TRANSMITTED INFECTION): ICD-10-CM

## 2024-01-29 PROCEDURE — G0101 CA SCREEN;PELVIC/BREAST EXAM: HCPCS | Performed by: OBSTETRICS & GYNECOLOGY

## 2024-01-29 RX ORDER — ESTRADIOL 0.5 MG/1
0.5 TABLET ORAL DAILY
Qty: 100 TABLET | Refills: 3 | Status: SHIPPED | OUTPATIENT
Start: 2024-01-29

## 2024-01-29 RX ORDER — ESTRADIOL 10 UG/1
10 INSERT VAGINAL
Qty: 36 TABLET | Refills: 4 | Status: SHIPPED | OUTPATIENT
Start: 2024-01-29

## 2024-01-29 SDOH — ECONOMIC STABILITY: FOOD INSECURITY: WITHIN THE PAST 12 MONTHS, YOU WORRIED THAT YOUR FOOD WOULD RUN OUT BEFORE YOU GOT MONEY TO BUY MORE.: NEVER TRUE

## 2024-01-29 SDOH — ECONOMIC STABILITY: FOOD INSECURITY: WITHIN THE PAST 12 MONTHS, THE FOOD YOU BOUGHT JUST DIDN'T LAST AND YOU DIDN'T HAVE MONEY TO GET MORE.: NEVER TRUE

## 2024-01-29 SDOH — ECONOMIC STABILITY: HOUSING INSECURITY
IN THE LAST 12 MONTHS, WAS THERE A TIME WHEN YOU DID NOT HAVE A STEADY PLACE TO SLEEP OR SLEPT IN A SHELTER (INCLUDING NOW)?: NO

## 2024-01-29 SDOH — ECONOMIC STABILITY: INCOME INSECURITY: HOW HARD IS IT FOR YOU TO PAY FOR THE VERY BASICS LIKE FOOD, HOUSING, MEDICAL CARE, AND HEATING?: NOT HARD AT ALL

## 2024-01-29 ASSESSMENT — PATIENT HEALTH QUESTIONNAIRE - PHQ9
SUM OF ALL RESPONSES TO PHQ QUESTIONS 1-9: 0
SUM OF ALL RESPONSES TO PHQ9 QUESTIONS 1 & 2: 0
SUM OF ALL RESPONSES TO PHQ QUESTIONS 1-9: 0
SUM OF ALL RESPONSES TO PHQ QUESTIONS 1-9: 0
1. LITTLE INTEREST OR PLEASURE IN DOING THINGS: 0
SUM OF ALL RESPONSES TO PHQ QUESTIONS 1-9: 0
2. FEELING DOWN, DEPRESSED OR HOPELESS: 0

## 2024-01-29 ASSESSMENT — ENCOUNTER SYMPTOMS
BLOOD IN STOOL: 0
RESPIRATORY NEGATIVE: 1
COUGH: 0
ABDOMINAL PAIN: 0
SHORTNESS OF BREATH: 0
GASTROINTESTINAL NEGATIVE: 1
EYES NEGATIVE: 1
ALLERGIC/IMMUNOLOGIC NEGATIVE: 1

## 2024-01-29 NOTE — PROGRESS NOTES
adenopathy.   Skin:     General: Skin is warm and dry.   Neurological:      General: No focal deficit present.      Mental Status: She is alert and oriented to person, place, and time.   Psychiatric:         Mood and Affect: Mood normal.         Behavior: Behavior normal.         Thought Content: Thought content normal.         Judgment: Judgment normal.          Assessment/plan: Dianthius was seen today for Annual Exam       Dianthius was seen today for annual exam.    Diagnoses and all orders for this visit:    Well woman exam with routine gynecological exam - rec try lubricants, direct clitoral stimulation before/during or immediately after intercourse.    Surgical menopause  -     estradiol (ESTRACE) 0.5 MG tablet; Take 1 tablet by mouth daily  -     Estradiol (VAGIFEM) 10 MCG TABS vaginal tablet; Place 1 tablet vaginally three times a week    Encounter for screening mammogram for malignant neoplasm of breast  -     [facility] VIJAY DIGITAL SCREEN BILATERAL; Future    Routine screening for STI (sexually transmitted infection)  -     Chlamydia, Gonorrhea, Trichomoniasis; Future  -     Chlamydia, Gonorrhea, Trichomoniasis    Screening examination for parasitic infection  -     Chlamydia, Gonorrhea, Trichomoniasis; Future  -     Chlamydia, Gonorrhea, Trichomoniasis         Return in about 2 years (around 1/29/2026) for Annual.

## 2024-01-31 LAB
C TRACH RRNA SPEC QL NAA+PROBE: NEGATIVE
N GONORRHOEA RRNA SPEC QL NAA+PROBE: NEGATIVE
SPECIMEN SOURCE: NORMAL
T VAGINALIS RRNA SPEC QL NAA+PROBE: NEGATIVE

## 2024-05-24 ENCOUNTER — HOSPITAL ENCOUNTER (EMERGENCY)
Age: 74
Discharge: HOME | End: 2024-05-24
Payer: MEDICARE

## 2024-05-24 ENCOUNTER — APPOINTMENT (OUTPATIENT)
Dept: GENERAL RADIOLOGY | Age: 74
End: 2024-05-24
Payer: MEDICARE

## 2024-05-24 VITALS
HEART RATE: 57 BPM | OXYGEN SATURATION: 99 % | SYSTOLIC BLOOD PRESSURE: 169 MMHG | RESPIRATION RATE: 18 BRPM | DIASTOLIC BLOOD PRESSURE: 79 MMHG | WEIGHT: 158 LBS | TEMPERATURE: 97.7 F | BODY MASS INDEX: 28 KG/M2 | HEIGHT: 63 IN

## 2024-05-24 DIAGNOSIS — S39.012A BACK STRAIN, INITIAL ENCOUNTER: Primary | ICD-10-CM

## 2024-05-24 PROCEDURE — 6360000002 HC RX W HCPCS: Performed by: PHYSICIAN ASSISTANT

## 2024-05-24 PROCEDURE — 6370000000 HC RX 637 (ALT 250 FOR IP): Performed by: PHYSICIAN ASSISTANT

## 2024-05-24 PROCEDURE — 99284 EMERGENCY DEPT VISIT MOD MDM: CPT

## 2024-05-24 PROCEDURE — 71101 X-RAY EXAM UNILAT RIBS/CHEST: CPT

## 2024-05-24 PROCEDURE — 96372 THER/PROPH/DIAG INJ SC/IM: CPT

## 2024-05-24 PROCEDURE — 72100 X-RAY EXAM L-S SPINE 2/3 VWS: CPT

## 2024-05-24 RX ORDER — DIAZEPAM 2 MG/1
2 TABLET ORAL ONCE
Status: COMPLETED | OUTPATIENT
Start: 2024-05-24 | End: 2024-05-24

## 2024-05-24 RX ORDER — KETOROLAC TROMETHAMINE 30 MG/ML
30 INJECTION, SOLUTION INTRAMUSCULAR; INTRAVENOUS
Status: COMPLETED | OUTPATIENT
Start: 2024-05-24 | End: 2024-05-24

## 2024-05-24 RX ORDER — LIDOCAINE 50 MG/G
1 PATCH TOPICAL DAILY
Qty: 10 PATCH | Refills: 0 | Status: SHIPPED | OUTPATIENT
Start: 2024-05-24 | End: 2024-06-03

## 2024-05-24 RX ORDER — BACLOFEN 5 MG/1
5 TABLET ORAL 3 TIMES DAILY
Qty: 21 TABLET | Refills: 0 | Status: SHIPPED | OUTPATIENT
Start: 2024-05-24 | End: 2024-05-31

## 2024-05-24 RX ORDER — LIDOCAINE 4 G/G
1 PATCH TOPICAL
Status: DISCONTINUED | OUTPATIENT
Start: 2024-05-24 | End: 2024-05-24 | Stop reason: HOSPADM

## 2024-05-24 RX ADMIN — DIAZEPAM 2 MG: 2 TABLET ORAL at 18:24

## 2024-05-24 RX ADMIN — KETOROLAC TROMETHAMINE 30 MG: 30 INJECTION, SOLUTION INTRAMUSCULAR at 18:24

## 2024-05-24 ASSESSMENT — PAIN - FUNCTIONAL ASSESSMENT: PAIN_FUNCTIONAL_ASSESSMENT: 0-10

## 2024-05-24 ASSESSMENT — ENCOUNTER SYMPTOMS
COUGH: 0
SHORTNESS OF BREATH: 0
EYE REDNESS: 0
BACK PAIN: 1
CHEST TIGHTNESS: 0
NAUSEA: 0
DIARRHEA: 0
RHINORRHEA: 0
ABDOMINAL DISTENTION: 0
VOMITING: 0
SORE THROAT: 0

## 2024-05-24 ASSESSMENT — LIFESTYLE VARIABLES
HOW OFTEN DO YOU HAVE A DRINK CONTAINING ALCOHOL: NEVER
HOW MANY STANDARD DRINKS CONTAINING ALCOHOL DO YOU HAVE ON A TYPICAL DAY: PATIENT DOES NOT DRINK

## 2024-05-24 ASSESSMENT — PAIN SCALES - GENERAL: PAINLEVEL_OUTOF10: 10

## 2024-05-24 NOTE — ED TRIAGE NOTES
Pt ambulatory into triage with . Pt reports she fall on her back today. Pt denies being on blood thinners/ denies LOC. Pt reports she hit her head. Pt reports left elbow is swollen.

## 2024-05-24 NOTE — ED PROVIDER NOTES
Emergency Department Provider Note       PCP: Lou Montano MD   Age: 74 y.o.   Sex: female     DISPOSITION Decision To Discharge 05/24/2024 07:03:38 PM       ICD-10-CM    1. Back strain, initial encounter  S39.012A           Medical Decision Making     Patient is a 74-year-old female presenting with left-sided back pain and lower rib pain after fall.  She was playing pickle ball and when she was running backwards she tripped and fell backwards landing on her back.  No head injury no neck pain no headache.  She states that she is having pain in her left lower back and left lower ribs that is worse with any slight movement.  Pain is tolerable if she is holding still.  She is afebrile, nontoxic in appearance, vital signs within appropriate limits.  She does have tenderness to palpation of the to L3 region as well as the left lumbar musculature.  She also has tenderness with palpation of the left lower posterior ribs, lungs clear to auscultation.  Will obtain xr imaging of the ribs and.  Lumbar spine.  She was given injection of Toradol, lidocaine patch and Valium.    X-rays of the ribs and lumbar spine do not show any acute findings.  Upon reevaluation she is resting comfortably in the bed states that she is feeling much better than initial presentation.  All results discussed with patient and spouse at bedside.  Will DC home with prescriptions for baclofen and lidocaine patches advised rest and avoidance of any heavy lifting or strenuous activity application of heat and ice to the area as well as over-the-counter anti-inflammatories.  Discussed reasons to return to the ED.     1 acute, uncomplicated illness or injury.  Over the counter drug management performed.  Prescription drug management performed.  Parental controlled substances given in the ED.  Shared medical decision making was utilized in creating the patients health plan today.    I independently ordered and reviewed each unique test.       I interpreted

## 2024-05-24 NOTE — ED NOTES
Pt states that she fell today and landed on her back. Denies any LOC or dizziness prior to or after the fall. States that she is having left lower back pain and left elbow pain and swelling

## 2024-05-24 NOTE — DISCHARGE INSTRUCTIONS
Your x-rays today did not show any broken bones.  I am sending you home with a prescription for baclofen which is a muscle relaxer you can take it once every 8 hours as needed for muscle spasm.  Also including a prescription for lidocaine patches which you can apply once daily to the left lower back.  If for some reason the lidocaine patches are very expensive insurance would not cover them he can buy nearly identical ones over-the-counter. I also recommend that she continue over-the-counter anti-inflammatory such as Motrin or Tylenol as needed for any further pain.  Recommend rest and avoidance of any heavy lifting or strenuous activity can apply warm compresses to the lower back as needed

## 2024-05-24 NOTE — ED NOTES
Patient mobility status  with no difficulty. Provider aware     I have reviewed discharge instructions with the patient.  The patient verbalized understanding.    Patient left ED via Discharge Method: ambulatory to Home with Spouse.    Opportunity for questions and clarification provided.     Patient given 2 scripts.

## 2025-03-03 ENCOUNTER — TELEPHONE (OUTPATIENT)
Dept: OBGYN CLINIC | Age: 75
End: 2025-03-03

## 2025-03-19 ENCOUNTER — TELEPHONE (OUTPATIENT)
Dept: OBGYN CLINIC | Age: 75
End: 2025-03-19

## 2025-03-19 NOTE — TELEPHONE ENCOUNTER
Called to inquire about location of Ultrasound scheduled. Returned call to inform of location of appointment

## 2025-03-24 ENCOUNTER — PROCEDURE VISIT (OUTPATIENT)
Dept: OBGYN CLINIC | Age: 75
End: 2025-03-24
Payer: MEDICARE

## 2025-03-24 ENCOUNTER — OFFICE VISIT (OUTPATIENT)
Dept: OBGYN CLINIC | Age: 75
End: 2025-03-24
Payer: MEDICARE

## 2025-03-24 VITALS
BODY MASS INDEX: 32.07 KG/M2 | WEIGHT: 181 LBS | HEIGHT: 63 IN | DIASTOLIC BLOOD PRESSURE: 68 MMHG | SYSTOLIC BLOOD PRESSURE: 124 MMHG

## 2025-03-24 DIAGNOSIS — N93.9 VAGINAL BLEEDING: Primary | ICD-10-CM

## 2025-03-24 DIAGNOSIS — R32 URINARY INCONTINENCE, UNSPECIFIED TYPE: ICD-10-CM

## 2025-03-24 DIAGNOSIS — N93.9 ABNORMAL VAGINAL BLEEDING: Primary | ICD-10-CM

## 2025-03-24 DIAGNOSIS — N39.3 SUI (STRESS URINARY INCONTINENCE, FEMALE): Primary | ICD-10-CM

## 2025-03-24 PROCEDURE — 1160F RVW MEDS BY RX/DR IN RCRD: CPT | Performed by: OBSTETRICS & GYNECOLOGY

## 2025-03-24 PROCEDURE — 1159F MED LIST DOCD IN RCRD: CPT | Performed by: OBSTETRICS & GYNECOLOGY

## 2025-03-24 PROCEDURE — 99213 OFFICE O/P EST LOW 20 MIN: CPT | Performed by: OBSTETRICS & GYNECOLOGY

## 2025-03-24 PROCEDURE — 76830 TRANSVAGINAL US NON-OB: CPT | Performed by: OBSTETRICS & GYNECOLOGY

## 2025-03-24 PROCEDURE — 1123F ACP DISCUSS/DSCN MKR DOCD: CPT | Performed by: OBSTETRICS & GYNECOLOGY

## 2025-03-24 NOTE — PROGRESS NOTES
in her father and mother..    ROS:Review of Systems  o/w NC     No results found for this visit on 03/24/25.     PHYSICAL EXAM:Blood pressure 124/68, height 1.6 m (5' 3\"), weight 82.1 kg (181 lb).  Body mass index is 32.06 kg/m².     A&Ox3.  NAD  NL thought/judgment  Psych - grossly NL, not anxious  Neuro - CN 2-12 grossly intact. NL gait and movement  HEENT - NC/AT EOMi, PERRL  Neck - supple, no thyromegaly  Pelvic: Vulva and vagina appear normal. Bimanual exam  is negative for masses.  Exam chaperoned by clinical staff.    Gyn ultrasound interpretation:  TA & TV images were reviewed by me personally.  The uterus is absent   The right ovary shows: no masses seen.  The left ovary shows: no masses seen.  Cuff WNL.  No FF     ASSESSMENT / PLAN:  Dianthius was seen today for vaginal bleeding and ultrasound.    Diagnoses and all orders for this visit:    Abnormal vaginal bleeding - check pap.  Prob d/t atrophic vaginitis, rec restart vERT.  Risks reviewed, extremely low risk med. Does have enough at home to get started, states will contact us when refills neeeded.  -     PAP LB, Reflex HPV ASCUS; Future  -     PAP LB, Reflex HPV ASCUS    Urinary incontinence, unspecified type - refer to UroGyn         No follow-ups on file.    Osmany Siddiqui MD   .

## 2025-03-26 ENCOUNTER — OFFICE VISIT (OUTPATIENT)
Dept: UROGYNECOLOGY | Age: 75
End: 2025-03-26
Payer: MEDICARE

## 2025-03-26 VITALS — BODY MASS INDEX: 31.71 KG/M2 | HEIGHT: 63 IN | WEIGHT: 179 LBS

## 2025-03-26 DIAGNOSIS — E89.40 SURGICAL MENOPAUSE: ICD-10-CM

## 2025-03-26 DIAGNOSIS — N81.89 WEAKNESS OF PELVIC FLOOR: ICD-10-CM

## 2025-03-26 DIAGNOSIS — N39.41 URGE INCONTINENCE: Primary | ICD-10-CM

## 2025-03-26 DIAGNOSIS — N39.3 SUI (STRESS URINARY INCONTINENCE, FEMALE): ICD-10-CM

## 2025-03-26 LAB
BILIRUBIN, URINE, POC: NEGATIVE
BLOOD URINE, POC: NORMAL
GLUCOSE URINE, POC: NEGATIVE
KETONES, URINE, POC: NEGATIVE
LEUKOCYTE ESTERASE, URINE, POC: NORMAL
NITRITE, URINE, POC: NEGATIVE
PH, URINE, POC: 7.5 (ref 4.6–8)
PROTEIN,URINE, POC: NEGATIVE
SPECIFIC GRAVITY, URINE, POC: 1.01 (ref 1–1.03)
URINALYSIS CLARITY, POC: NORMAL
URINALYSIS COLOR, POC: YELLOW
UROBILINOGEN, POC: NORMAL MG/DL

## 2025-03-26 PROCEDURE — 99204 OFFICE O/P NEW MOD 45 MIN: CPT | Performed by: OBSTETRICS & GYNECOLOGY

## 2025-03-26 PROCEDURE — 81002 URINALYSIS NONAUTO W/O SCOPE: CPT | Performed by: OBSTETRICS & GYNECOLOGY

## 2025-03-26 PROCEDURE — 51701 INSERT BLADDER CATHETER: CPT | Performed by: OBSTETRICS & GYNECOLOGY

## 2025-03-26 PROCEDURE — 1123F ACP DISCUSS/DSCN MKR DOCD: CPT | Performed by: OBSTETRICS & GYNECOLOGY

## 2025-03-26 PROCEDURE — 1159F MED LIST DOCD IN RCRD: CPT | Performed by: OBSTETRICS & GYNECOLOGY

## 2025-03-26 RX ORDER — ESTRADIOL 0.5 MG/1
0.5 TABLET ORAL DAILY
Qty: 100 TABLET | Refills: 3 | Status: SHIPPED | OUTPATIENT
Start: 2025-03-26

## 2025-03-26 RX ORDER — CYCLOBENZAPRINE HCL 10 MG
TABLET ORAL
COMMUNITY

## 2025-03-26 RX ORDER — MIRABEGRON 50 MG/1
50 TABLET, FILM COATED, EXTENDED RELEASE ORAL DAILY
Qty: 30 TABLET | Refills: 11 | Status: SHIPPED | OUTPATIENT
Start: 2025-03-26

## 2025-03-26 NOTE — PROGRESS NOTES
well as therapeutic massage and ultrasound for pain.    I recommend Physical therapy and will send a referral.     Orders:  -     AMB POC URINALYSIS DIP STICK MANUAL W/O MICRO  -     INSERT,NON-INDWELLING BLADDER CATHETER  -     Ambulatory referral to Physical Therapy       Return in about 3 months (around 6/26/2025) for UUI/ CAMILA.    On this date 3/26/2025 I have spent 47 minutes reviewing previous notes, test results and face to face with the patient discussing the diagnosis and importance of compliance with the treatment plan as well as documenting on the day of the visit. This is exclusive of separately reported procedures.     Ninfa Chavarria DO

## 2025-03-26 NOTE — PATIENT INSTRUCTIONS
Pelvic Floor Therapy List:    Locations within [facility]    Scheduling phone number: [phone]    [facility]       [address]  [facility] [address]    [facility][note 3]  [address]  [facility] [address]    [facility]  [address]  [place] [address]    [facility]  [address] [address]      Locations Outside of [facility]    [facility]- (Pamela Yanez & Lorena Wells)  [address]  [facility] [address]  Phone: [phone]  Fax [phone]    [facility] -(Evelyn Stein)  [address] [address]  Phone: [phone]  Fax: [phone]    [facility] (Joann Williamson)  [address]  [place] [address]  Phone: [phone]  Fax: [phone]    [facility] (Vikki Ward)  [address] [address]  Phone: [phone]  Fax: [phone]    [facility] (Dr. Cierra Chin and Dr. Joann [place])  [address],   [place] [address]   Phone: [phone]  Fax: [phone]  &  [address] [address]    [facility] (Carmen Barraza)  [address] [place] [address]  Phone: [phone]  Fax: [phone]    [facility]- (Trinity Leal)  [address]  [facility] [address]  Phone: [phone]  Fax: [phone]    [facility] (Estefanía Miller)  [address] [place] [address] [phone]  Fax [phone]    [facility]- (Alberto Rowe)  [address]   [place] [address]  Phone: [phone]  Fax: [phone]    [facility] (Lorena Lopez)  [address] [place] [address]  Phone: [phone]  Fax: [phone]    Patient Education        mirabegron  Pronunciation:  DEB ballard  Brand:  Myrbetriq  What is the most important information I should know about mirabegron?  You

## 2025-03-26 NOTE — ASSESSMENT & PLAN NOTE
We discussed the differential diagnosis of urinary incontinence. She is aware that women leak urine for multiple different reasons. Many women have more than one type of urinary incontinence. We also discussed the pathogenesis and etiology of stress urinary incontinence. I explained the epidemiology of incontinence. I offered her options which include nothing, dietary modifications, physical therapy, barrier treatment, in-office procedures and surgery.     My recommendations:  Please eliminate bladder irritants. This includes caffeine, artificial sweeteners, carbonated beverages, alcohol, tomato based products, citrus and spicy foods.   I highly recommend pelvic floor physical therapy.   We briefly discussed procedures for this as well.     UUI> CAMILA so we will focus on this first.

## 2025-03-26 NOTE — ASSESSMENT & PLAN NOTE
We discussed the differential diagnosis of urinary urgency/ urge incontinence. She is aware that women leak urine for many different reasons. We discussed the epidemiology, pathogenesis, and etiology of bladder overactivity including the neurophysiologic axis.  We also discussed the treatment pathway for UUI/OAB. I offered options which include nothing, dietary modifications, medications, physical therapy, behavior modification, botox injections and neuromodulation.      My recommendations:  Please eliminate bladder irritants. This includes caffeine, artificial sweeteners, carbonated beverages, alcohol, tomato based products, citrus and spicy foods.   I highly recommend pelvic floor physical therapy.   Medications were also discussed medications. She is currently on vesicare. She has been on this for about 1 year without relief of symptoms.   The patient is currently on an anticholinergic medication for treatment of OAB. She was screened for contraindications to use. We reviewed indication for use, potential common SEs, and instructions for use. We reviewed the association of this medication class to memory loss and development of dementia, especially after prolonged exposure (just 2 years). Alternatives to medication were discussed, and include conservative changes with dietary and behavioral modification and pelvic floor PT. We also discussed use of another medication class which includes mirabegron. She does not have uncontrolled BP. She is aware that there is no absolute cure for OAB and that treatment is aimed at symptom improvement. The patient's expressed preference is:    discontinue current medication (vesicare), start Myrbetriq, and attempt conservative measures as listed above.

## 2025-03-28 LAB
COLLECTION METHOD: NORMAL
CYTOLOGIST CVX/VAG CYTO: NORMAL
CYTOLOGY CVX/VAG DOC THIN PREP: NORMAL
DATE OF LMP: 0
HPV REFLEX: NORMAL
Lab: NORMAL
OTHER PT INFO: NORMAL
PAP SOURCE: NORMAL
PATH REPORT.FINAL DX SPEC: NORMAL
PREV CYTO INFO: NORMAL
PREV TREATMENT RESULTS: NORMAL
PREV TREATMENT: NORMAL
STAT OF ADQ CVX/VAG CYTO-IMP: NORMAL

## 2025-03-31 ENCOUNTER — HOSPITAL ENCOUNTER (OUTPATIENT)
Dept: PHYSICAL THERAPY | Age: 75
Setting detail: RECURRING SERIES
Discharge: HOME | End: 2025-04-03
Attending: OBSTETRICS & GYNECOLOGY
Payer: MEDICARE

## 2025-03-31 ENCOUNTER — RESULTS FOLLOW-UP (OUTPATIENT)
Dept: OBGYN CLINIC | Age: 75
End: 2025-03-31

## 2025-03-31 DIAGNOSIS — R27.8 OTHER LACK OF COORDINATION: ICD-10-CM

## 2025-03-31 DIAGNOSIS — M62.81 MUSCLE WEAKNESS (GENERALIZED): ICD-10-CM

## 2025-03-31 DIAGNOSIS — N39.41 URGE INCONTINENCE: Primary | ICD-10-CM

## 2025-03-31 PROCEDURE — 97162 PT EVAL MOD COMPLEX 30 MIN: CPT | Performed by: PHYSICAL THERAPIST

## 2025-03-31 PROCEDURE — 97530 THERAPEUTIC ACTIVITIES: CPT | Performed by: PHYSICAL THERAPIST

## 2025-03-31 PROCEDURE — 97110 THERAPEUTIC EXERCISES: CPT | Performed by: PHYSICAL THERAPIST

## 2025-03-31 NOTE — PROGRESS NOTES
Chiontcheo Alcantara  : 1950  Primary: [employer] Medicare-advantage Ppo (Medicare Managed)  Secondary:  [facility] @ [address]  [facility] [address]  Phone: [phone]  Fax: [phone] Plan Frequency: 1-2x/week    Plan of Care/Certification Expiration Date: 25        Plan of Care/Certification Expiration Date:  Plan of Care/Certification Expiration Date: 25    Frequency/Duration: Plan Frequency: 1-2x/week      Time In/Out:   Time In: 0358  Time Out: 0448      PT Visit Info:         Visit Count:  1    OUTPATIENT PHYSICAL THERAPY:   Treatment Note 3/31/2025       Episode  (PFPT)                 Treatment Diagnosis:     Urge incontinence  Muscle weakness (generalized)  Other lack of coordination    Medical/Referring Diagnosis:    CAMILA (stress urinary incontinence, female)  Urge incontinence  Weakness of pelvic floor    Referring Physician:  Ninfa Chavarria DO MD Orders:  PT Eval and Treat   Return MD Appt:     Date of Onset:  No data recorded   Allergies:   Codeine and Statins  Restrictions/Precautions:   None      Interventions Planned (Treatment may consist of any combination of the following):     See Assessment Note    Subjective Comments:   See IE dated 3/31/25  Initial Pain Level::      /10  Post Session Pain Level:        /10  Medications Last Reviewed:  3/31/2025  Updated Objective Findings:  See Evaluation Note from today  Treatment   THERAPEUTIC EXERCISE: (8 minutes): Exercises per grid below to improve strength and coordination.  Required moderate verbal cues to promote proper body mechanics and promote proper body breathing techniques.  Progressed repetitions as indicated.   Date:  3/31/25 Date:   Date:     Activity/Exercise Parameters Parameters Parameters   HEP Coordinated PFM contractions in supine x 10, 3x/day - emphasis on isolation of PFM contraction and reduced compensatory use of abdominal and adductor musculature

## 2025-03-31 NOTE — THERAPY EVALUATION
Po Alcantara  : 1950  Primary: [employer] (Medicare Managed)  Secondary:  [facility] @ [address]  [facility] [address]  Phone: [phone]  Fax: [phone] Plan Frequency: 1-2x/week  Plan of Care/Certification Expiration Date: 25        Plan of Care/Certification Expiration Date:  Plan of Care/Certification Expiration Date: 25    Frequency/Duration: Plan Frequency: 1-2x/week      Time In/Out:   Time In: 0358  Time Out: 8      PT Visit Info:    Plan Frequency: 1-2x/week      Visit Count:  1                OUTPATIENT PHYSICAL THERAPY:             Initial Assessment 3/31/2025               Episode (PFPT)         Treatment Diagnosis:     Urge incontinence  Muscle weakness (generalized)  Other lack of coordination    Medical/Referring Diagnosis:    CAMILA (stress urinary incontinence, female)  Urge incontinence  Weakness of pelvic floor      Referring Physician:  Ninfa Chavarria DO MD Orders:  PT Eval and Treat   Return MD Appt:    Date of Onset:    Chronic  Allergies:  Codeine and Statins  Restrictions/Precautions:    None      Medications Last Reviewed:  3/31/2025     SUBJECTIVE   History of Injury/Illness (Reason for Referral):  Ms. Alcantara is a 74 yo female referred to pelvic floor physical therapy (PFPT) by Ninfa Chavarria DO 2/2 CAMILA (stress urinary incontinence, female) [N39.3]  Urge incontinence [N39.41]  Weakness of pelvic floor [N81.89].    Pt describes years of UI. Incontinence has worsened in the last year.  Pt emptying her bladder every 45 minutes due to urgency.     She leaks with placing her key in the door, must rush to the restroom in the morning, urge + transitional movement, coughing (less consistent), sneezing (less consistent). Does not leak with exercise.    She has tried kegal exercises and did not have change in symptoms. She is unable to stop her urine mid-stream.   She has tried medication and

## 2025-04-10 ENCOUNTER — HOSPITAL ENCOUNTER (OUTPATIENT)
Dept: PHYSICAL THERAPY | Age: 75
Setting detail: RECURRING SERIES
Discharge: HOME | End: 2025-04-13
Attending: OBSTETRICS & GYNECOLOGY
Payer: MEDICARE

## 2025-04-10 PROCEDURE — 97530 THERAPEUTIC ACTIVITIES: CPT | Performed by: PHYSICAL THERAPIST

## 2025-04-10 PROCEDURE — 97110 THERAPEUTIC EXERCISES: CPT | Performed by: PHYSICAL THERAPIST

## 2025-04-10 NOTE — PROGRESS NOTES
Po Alcantara  : 1950  Primary: [employer] (Medicare Managed)  Secondary:  [facility] @ [address]  [facility] [address]  Phone: [phone]  Fax: [phone] Plan Frequency: 1-2x/week    Plan of Care/Certification Expiration Date: 25        Plan of Care/Certification Expiration Date:  Plan of Care/Certification Expiration Date: 25    Frequency/Duration: Plan Frequency: 1-2x/week      Time In/Out:   Time In: 0305  Time Out: 0402      PT Visit Info:         Visit Count:  2    OUTPATIENT PHYSICAL THERAPY:   Treatment Note 4/10/2025       Episode  (PFPT)                 Treatment Diagnosis:     Urge incontinence  Muscle weakness (generalized)  Other lack of coordination    Medical/Referring Diagnosis:    CAMILA (stress urinary incontinence, female)  Urge incontinence  Weakness of pelvic floor    Referring Physician:  Ninfa Chavarria DO MD Orders:  PT Eval and Treat   Return MD Appt:     Date of Onset:  No data recorded   Allergies:   Codeine and Statins  Restrictions/Precautions:   None      Interventions Planned (Treatment may consist of any combination of the following):     See Assessment Note    Subjective Comments:   Pt denies pelvic pain following previous session.   She is seeing improvement since starting Myrbetriq.   Pt using less pads.   Pt working on consistency of her PFM contraction.       History of Injury/Illness (Reason for Referral):  Ms. Alcantara is a 76 yo female referred to pelvic floor physical therapy (PFPT) by Ninfa Chavarria DO 2/2 CAMILA (stress urinary incontinence, female) [N39.3]  Urge incontinence [N39.41]  Weakness of pelvic floor [N81.89].     Pt describes years of UI. Incontinence has worsened in the last year.  Pt emptying her bladder every 45 minutes due to urgency.      She leaks with placing her key in the door, must rush to the restroom in the morning, urge + transitional movement, coughing (less

## 2025-05-13 ENCOUNTER — CLINICAL DOCUMENTATION (OUTPATIENT)
Dept: PHYSICAL THERAPY | Age: 75
End: 2025-05-13

## 2025-05-13 NOTE — THERAPY DISCHARGE
[facility] @ [address]  [facility] [address]  Phone: [phone]  Fax: [phone]    OUTPATIENT PHYSICAL THERAPY  Discharge Summary 5/13/2025  Episode  Appt Desk         Po Alcantara has been seen in physical therapy for 2  visits. Ms. Alcantara's therapy has come to an end at this time due to: Patient did not return for/schedule additional treatment  Expiration of plan of care without further referral by ordering physician    Physical Therapy Goals:  Not Met: Reasons for goals not being achieved: did not complete POC    Ruthie Mcguire, PT

## 2025-06-26 ENCOUNTER — OFFICE VISIT (OUTPATIENT)
Dept: UROGYNECOLOGY | Age: 75
End: 2025-06-26
Payer: MEDICARE

## 2025-06-26 DIAGNOSIS — N39.3 SUI (STRESS URINARY INCONTINENCE, FEMALE): ICD-10-CM

## 2025-06-26 DIAGNOSIS — N81.89 WEAKNESS OF PELVIC FLOOR: ICD-10-CM

## 2025-06-26 DIAGNOSIS — N39.41 URGE INCONTINENCE: Primary | ICD-10-CM

## 2025-06-26 PROCEDURE — 99212 OFFICE O/P EST SF 10 MIN: CPT | Performed by: OBSTETRICS & GYNECOLOGY

## 2025-06-26 PROCEDURE — 1123F ACP DISCUSS/DSCN MKR DOCD: CPT | Performed by: OBSTETRICS & GYNECOLOGY

## 2025-06-26 NOTE — ASSESSMENT & PLAN NOTE
Ms. Alcantara attended 2 visits of PT with Ruthie Mcguire. She enjoyed them. She states that PT never scheuled her any more apts and I do not see any missd apt in the chart. She would like to cont, so I resent a referral.

## 2025-06-26 NOTE — PROGRESS NOTES
[facility]  [address]  [facility] [address]  Dept: [phone]    PCP:  Lou Montano MD    6/26/2025      HPI:  Po Alcantara is here to follow up on Follow-up (Follow up - UUI/CAMILA )    Patient was last at [facility] on 3/26/2025. At that visit patient recommendations were to eliminate bladder irritants, pelvic floor PT was highly recommended, and medications were also discussed. She stopped the  Vesicare.     Patient is happy with the results of Myrbetriq but has expressed concerns of the cost. She is voiding 6 times per day and she is getting up 1 time at night to void. She is leaking 6 times per day right before she goes to the restroom. She is able to get to the restroom now though, so this is a big improvement.         No results found for this visit on 06/26/25.    LMP  (LMP Unknown)       1. Urge incontinence  Assessment & Plan:  Cont with myrbetriq.     She knows to schedule an apt in 10 months for refills, or she may get refills form PCP.    Orders:  -     Ambulatory referral to Physical Therapy  2. CAMILA (stress urinary incontinence, female)  -     Ambulatory referral to Physical Therapy  3. Weakness of pelvic floor  Assessment & Plan:   Ms. Alcantara attended 2 visits of PT with Ruthie Mcguire. She enjoyed them. She states that PT never scheuled her any more apts and I do not see any missd apt in the chart. She would like to cont, so I resent a referral.   Orders:  -     Ambulatory referral to Physical Therapy     No follow-ups on file.          Ninfa Chavarria,

## 2025-06-26 NOTE — ASSESSMENT & PLAN NOTE
Cont with myrbetriq.     She knows to schedule an apt in 10 months for refills, or she may get refills form PCP.

## 2025-07-28 ENCOUNTER — APPOINTMENT (OUTPATIENT)
Dept: PHYSICAL THERAPY | Age: 75
End: 2025-07-28
Attending: OBSTETRICS & GYNECOLOGY
Payer: MEDICARE

## 2025-07-29 NOTE — PROGRESS NOTES
Chiontcheo Alcantara  : 1950  Primary: [employer] Medicare-advantage Ppo (Medicare Managed)  Secondary:  [facility] @ [address]  [facility] [address]  Phone: [phone]  Fax: [phone] Plan Frequency: 1-2x/week    Plan of Care/Certification Expiration Date: 25        Plan of Care/Certification Expiration Date:  Plan of Care/Certification Expiration Date: 25    Frequency/Duration: Plan Frequency: 1-2x/week      Time In/Out:   Time In: 1005  Time Out: 1100      PT Visit Info:         Visit Count:  3    OUTPATIENT PHYSICAL THERAPY:   Treatment Note 2025       Episode  (PFPT2)                 Treatment Diagnosis:     Stress incontinence (female) (male)  Urge incontinence  Muscle weakness (generalized)    Medical/Referring Diagnosis:    Urge incontinence  CAMILA (stress urinary incontinence, female)  Weakness of pelvic floor    Referring Physician:  Ninfa Chavarria DO MD Orders:  PT Eval and Treat   Return MD Appt:     Date of Onset:  No data recorded   Allergies:   Codeine and Statins  Restrictions/Precautions:   None      Interventions Planned (Treatment may consist of any combination of the following):     See Assessment Note    Subjective Comments:   See IE dated 25    Initial Pain Level::      /10  Post Session Pain Level:        /10  Medications Last Reviewed:  2025  Updated Objective Findings:  See Evaluation Note from today    Treatment   THERAPEUTIC EXERCISE: (5 minutes)       Most Recent Treatment:   Date:  25   Activity/Exercise    PFM Contractions Reviewed when to complete and appropriate coordination.   Quick Flicks    Long Holds    Elevators with Quick Release    Elevators with Controlled Release    PFM Lengthening (Bulges) 3-5 minutes, 2x/day   Emphasis on coordination of PFM relaxation due to elevated tension in LA muscles.        Cat/Cow    Child's Pose    Happy Baby    Cobra        TrA Setting    TrA Firing with PPT

## 2025-07-29 NOTE — THERAPY EVALUATION
Occupation: Retired     Works 1 day/week.     Learning:   Does the patient/guardian have any barriers to learning?: No barriers    Fall Risk Scale:   Garcia Total Score: 25       OBJECTIVE   Chaperone for Intimate Exam  Chaperone was offered as part of the rooming process. Patient declined and agrees to continue with exam without a chaperone.  Pt gives verbal consent to internal vaginal assessment/treatment.    External Observations:  Voluntary contraction: Present  Voluntary relaxation: Present  Involuntary contraction: Present  Involuntary relaxation: Present  Perineal descent at rest: Absent   Perineal descent with bearing: Present  Postural assessment: WNL  Gait: WNL    Pelvic Floor Muscle (PFM) Assessment:  Vaginal vault size: [] decreased     [] increased     [x] WNL  Muscle volume: [] decreased     [] WNL     [x] Defect  PFM tension: [] decreased     [x] increased     [] WNL    Contraction ability:  Voluntary contraction: [] absent     [x] weak     [] moderate      [] strong  Voluntary relaxation: [] absent     [] complete   [x] incomplete   [] delayed   MMT: 2/5 - elevated tension in LA, little contractility through superficial musculature.   PFM endurance: 3 seconds   Number of quick contractions in 10 seconds: 5  Quality of contractions: Fair  Overflow: [] absent     [x] min     [] mod     [] severe    Tissue laxity test:  Anterior wall: [] minimum     [] moderate     [] severe      [x] WNL  Posterior wall: [] minimum     [] moderate     [] severe      [x] WNL    Palpation: via vaginal canal  Superficial Pelvic Floor Musculature (PFM): Tender? Intermediate PFM Tender? Deep PFM Tender?   Superficial Transverse Perineal [x] Right  [x] Left  []DNT Deep Transverse Perineal [x] Right  [x] Left  []DNT Puborectalis [x] Right  [x] Left  []DNT   Ischiocavernosus [] Right  [] Left  []DNT Compressor Urethra [] Right  [] Left  []DNT Pubococcygeus [] Right  [] Left  []DNT   Bulbocavernosus [] Right  [] Left  []DNT

## 2025-07-30 ENCOUNTER — HOSPITAL ENCOUNTER (OUTPATIENT)
Dept: PHYSICAL THERAPY | Age: 75
Setting detail: RECURRING SERIES
Discharge: HOME | End: 2025-08-02
Attending: OBSTETRICS & GYNECOLOGY
Payer: MEDICARE

## 2025-07-30 DIAGNOSIS — M62.81 MUSCLE WEAKNESS (GENERALIZED): ICD-10-CM

## 2025-07-30 DIAGNOSIS — N39.3 STRESS INCONTINENCE (FEMALE) (MALE): Primary | ICD-10-CM

## 2025-07-30 DIAGNOSIS — N39.41 URGE INCONTINENCE: ICD-10-CM

## 2025-07-30 PROCEDURE — 97162 PT EVAL MOD COMPLEX 30 MIN: CPT | Performed by: PHYSICAL THERAPIST

## 2025-07-30 PROCEDURE — 97530 THERAPEUTIC ACTIVITIES: CPT | Performed by: PHYSICAL THERAPIST

## 2025-08-13 ENCOUNTER — HOSPITAL ENCOUNTER (OUTPATIENT)
Dept: PHYSICAL THERAPY | Age: 75
Setting detail: RECURRING SERIES
Discharge: HOME | End: 2025-08-16
Attending: OBSTETRICS & GYNECOLOGY
Payer: MEDICARE

## 2025-08-13 PROCEDURE — 97140 MANUAL THERAPY 1/> REGIONS: CPT | Performed by: PHYSICAL THERAPIST

## 2025-08-13 PROCEDURE — 97110 THERAPEUTIC EXERCISES: CPT | Performed by: PHYSICAL THERAPIST

## 2025-08-20 ENCOUNTER — HOSPITAL ENCOUNTER (OUTPATIENT)
Dept: PHYSICAL THERAPY | Age: 75
Setting detail: RECURRING SERIES
Discharge: HOME | End: 2025-08-23
Attending: OBSTETRICS & GYNECOLOGY
Payer: MEDICARE

## 2025-08-20 PROCEDURE — 97530 THERAPEUTIC ACTIVITIES: CPT | Performed by: PHYSICAL THERAPIST

## 2025-08-20 PROCEDURE — 97110 THERAPEUTIC EXERCISES: CPT | Performed by: PHYSICAL THERAPIST

## 2025-08-27 ENCOUNTER — APPOINTMENT (OUTPATIENT)
Dept: PHYSICAL THERAPY | Age: 75
End: 2025-08-27
Attending: OBSTETRICS & GYNECOLOGY
Payer: MEDICARE

## 2025-09-03 DIAGNOSIS — E89.40 SURGICAL MENOPAUSE: ICD-10-CM

## 2025-09-03 RX ORDER — ESTRADIOL 10 UG/1
10 TABLET, FILM COATED VAGINAL
Qty: 90 TABLET | Refills: 2 | Status: SHIPPED | OUTPATIENT
Start: 2025-09-03